# Patient Record
Sex: FEMALE | Race: WHITE | Employment: PART TIME | URBAN - METROPOLITAN AREA
[De-identification: names, ages, dates, MRNs, and addresses within clinical notes are randomized per-mention and may not be internally consistent; named-entity substitution may affect disease eponyms.]

---

## 2021-04-07 ENCOUNTER — OFFICE VISIT (OUTPATIENT)
Dept: SURGERY | Age: 56
End: 2021-04-07
Payer: COMMERCIAL

## 2021-04-07 VITALS
DIASTOLIC BLOOD PRESSURE: 76 MMHG | HEART RATE: 78 BPM | TEMPERATURE: 98.1 F | HEIGHT: 68 IN | SYSTOLIC BLOOD PRESSURE: 125 MMHG

## 2021-04-07 DIAGNOSIS — Z17.0 MALIGNANT NEOPLASM OF UPPER-INNER QUADRANT OF LEFT BREAST IN FEMALE, ESTROGEN RECEPTOR POSITIVE (HCC): Primary | ICD-10-CM

## 2021-04-07 DIAGNOSIS — C50.212 MALIGNANT NEOPLASM OF UPPER-INNER QUADRANT OF LEFT BREAST IN FEMALE, ESTROGEN RECEPTOR POSITIVE (HCC): Primary | ICD-10-CM

## 2021-04-07 PROCEDURE — 99205 OFFICE O/P NEW HI 60 MIN: CPT | Performed by: SURGERY

## 2021-04-07 PROCEDURE — 76642 ULTRASOUND BREAST LIMITED: CPT | Performed by: SURGERY

## 2021-04-07 RX ORDER — ESTRADIOL AND NORETHINDRONE ACETATE .5; .1 MG/1; MG/1
TABLET ORAL
COMMUNITY
Start: 2021-03-09 | End: 2021-04-30

## 2021-04-07 NOTE — PATIENT INSTRUCTIONS
Breast Cancer: Care Instructions Your Care Instructions Breast cancer occurs when abnormal cells grow out of control in the breast. These cancer cells can spread within the breast, to nearby lymph nodes and other tissues, and to other parts of the body. Being treated for cancer can weaken your body, and you may feel very tired. Get the rest your body needs so you can feel better. Finding out that you have cancer is scary. You may feel many emotions and may need some help coping. Seek out family, friends, and counselors for support. You also can do things at home to make yourself feel better while you go through treatment. Call the [employer] (8-334.945.9626) or visit its website at [employer] for more information. Follow-up care is a key part of your treatment and safety. Be sure to make and go to all appointments, and call your doctor if you are having problems. It's also a good idea to know your test results and keep a list of the medicines you take. How can you care for yourself at home? · Take your medicines exactly as prescribed. Call your doctor if you think you are having a problem with your medicine. You may get medicine for nausea and vomiting if you have these side effects. · Follow your doctor's instructions to relieve pain. Pain from cancer and surgery can almost always be controlled. Use pain medicine when you first notice pain, before it becomes severe. · Eat healthy food. If you do not feel like eating, try to eat food that has protein and extra calories to keep up your strength and prevent weight loss. Drink liquid meal replacements for extra calories and protein. Try to eat your main meal early. · Get some physical activity every day, but do not get too tired. Keep doing the hobbies you enjoy as your energy allows. · Do not smoke. Smoking can make your cancer worse. If you need help quitting, talk to your doctor about stop-smoking programs and medicines.  These can increase your chances of quitting for good. · Take steps to control your stress and workload. Learn relaxation techniques. ? Share your feelings. Stress and tension affect our emotions. By expressing your feelings to others, you may be able to understand and cope with them. ? Consider joining a support group. Talking about a problem with your spouse, a good friend, or other people with similar problems is a good way to reduce tension and stress. ? Express yourself through art. Try writing, crafts, dance, or art to relieve stress. Some dance, writing, or art groups may be available just for people who have cancer. ? Be kind to your body and mind. Getting enough sleep, eating a healthy diet, and taking time to do things you enjoy can contribute to an overall feeling of balance in your life and can help reduce stress. ? Get help if you need it. Discuss your concerns with your doctor or counselor. · If you are vomiting or have diarrhea: ? Drink plenty of fluids to prevent dehydration. Choose water and other caffeine-free clear liquids. If you have kidney, heart, or liver disease and have to limit fluids, talk with your doctor before you increase the amount of fluids you drink. ? When you are able to eat, try clear soups, mild foods, and liquids until all symptoms are gone for 12 to 48 hours. Other good choices include dry toast, crackers, cooked cereal, and gelatin dessert, such as Jell-O. · If you have not already done so, prepare a list of advance directives. Advance directives are instructions to your doctor and family members about what kind of care you want if you become unable to speak or express yourself. When should you call for help? Call 911 anytime you think you may need emergency care. For example, call if: 
  · You passed out (lost consciousness).   
Call your doctor now or seek immediate medical care if: 
  · You have a fever.  
  · You have abnormal bleeding.  
  · You think you have an infection.  
  · You have new or worse pain.  
  · You have new symptoms, such as a cough, belly pain, vomiting, diarrhea, or a rash. Watch closely for changes in your health, and be sure to contact your doctor if: 
  · You are much more tired than usual.  
  · You have swollen glands in your armpits, groin, or neck.  
  · You do not get better as expected. Where can you learn more? Go to http://www.[employer].com/ Enter V321 in the search box to learn more about \"Breast Cancer: Care Instructions. \" Current as of: December 17, 2020               Content Version: 12.8 © 1280-8044 [employer]. Care instructions adapted under license by [employer] (which disclaims liability or warranty for this information). If you have questions about a medical condition or this instruction, always ask your healthcare professional. [address] any warranty or liability for your use of this information.

## 2021-04-07 NOTE — PROGRESS NOTES
HISTORY OF PRESENT ILLNESS  Elton Cobb is a 63 y.o. female. HPI  NEW patient consult referred by  Dr. Clara Suarez for LEFT breast cancer. Cancer found on screening mammogram. Denies palpable lumps or breast changes. No pain. 3/10/21 - LEFT breast biopsy -  LEFT breast invasive mammary cancer, . , HER2 negative    Family History:    Maternal grandmother, breast cancer,  in her 52's  A lot of cancer on mother's side      Mammogram, 606/706 [facility] Fer[facility], 3/3/2021, BIRADS 4 left breast calcs 9:00 leading to stereotactic biopsy    No past medical history on file. No past surgical history on file.   Social History     Socioeconomic History    Marital status:      Spouse name: Not on file    Number of children: Not on file    Years of education: Not on file    Highest education level: Not on file   Occupational History    Not on file   Social Needs    Financial resource strain: Not on file    Food insecurity     Worry: Not on file     Inability: Not on file    Transportation needs     Medical: Not on file     Non-medical: Not on file   Tobacco Use    Smoking status: Never Smoker    Smokeless tobacco: Never Used   Substance and Sexual Activity    Alcohol use: Not Currently    Drug use: Not on file    Sexual activity: Not on file   Lifestyle    Physical activity     Days per week: Not on file     Minutes per session: Not on file    Stress: Not on file   Relationships    Social connections     Talks on phone: Not on file     Gets together: Not on file     Attends [employer] service: Not on file     Active member of club or organization: Not on file     Attends meetings of clubs or organizations: Not on file     Relationship status: Not on file    Intimate partner violence     Fear of current or ex partner: Not on file     Emotionally abused: Not on file     Physically abused: Not on file     Forced sexual activity: Not on file   Other Topics Concern    Not on file   Social History Narrative  Not on file     OB History        2    Para        Term                AB        Living           SAB        TAB        Ectopic        Molar        Multiple        Live Births              Obstetric Comments   Menarche 14, LMP 45, # of children 2, age of 4st delivery 21, Hysterectomy/oophorectomy no/no, Breast bx yes, history of breast feeding yes, BCP yes, Hormone therapy yes             Current Outpatient Medications:     estradiol-norethindrone (ACTIVELLA) 0.5-0.1 mg per tablet, TAKE 1 TABLET BY MOUTH EVERY DAY, Disp: , Rfl:   Allergies   Allergen Reactions    Other Plant, Animal, Environmental Other (comments)     Cats and dogs        Review of Systems   HENT: Positive for tinnitus. All other systems reviewed and are negative. Physical Exam  Vitals signs and nursing note reviewed. Constitutional:       Appearance: She is well-developed. HENT:      Head: Normocephalic. Neck:      Musculoskeletal: Neck supple. Thyroid: No thyromegaly. Cardiovascular:      Rate and Rhythm: Normal rate and regular rhythm. Heart sounds: Normal heart sounds. Pulmonary:      Effort: Pulmonary effort is normal.      Breath sounds: Normal breath sounds. Chest:      Breasts: Breasts are symmetrical.         Right: No inverted nipple, mass, nipple discharge, skin change or tenderness. Left: No inverted nipple, mass, nipple discharge, skin change or tenderness. Abdominal:      Palpations: Abdomen is soft. Musculoskeletal: Normal range of motion. Lymphadenopathy:      Cervical: No cervical adenopathy. Skin:     General: Skin is warm and dry. Neurological:      Mental Status: She is alert and oriented to person, place, and time. BREAST ULTRASOUND, Preop planning  Indication:preop planning  left Breast 9:00   Technique:   The area was scanned using a high-frequency linear-array near-field transducer  Findings: biopsy site not visible   Impression: Biopsy site not visible with ultrasound  Disposition:  Will schedule mri then lumpectomy with sentinel lymph node biopsy  ASSESSMENT and PLAN    ICD-10-CM ICD-9-CM    1. Malignant neoplasm of upper-inner quadrant of left breast in female, estrogen receptor positive ([facility] [address]  C50.212 174.2 MRI BREAST BI W WO CONT    Z17.0 V86.0      64 yo female with left breast  LEFT breast invasive mammary cancer, . , HER2 negative    I have reviewed the imaging and pathology with her and she was given copies of these reports. 90 minutes were spent face-to-face with the patient during this encounter and 90% of that time was spent on counseling and coordination of care. 1. Discussed lumpectomy and radiation vs mastectomy. Discussed reconstruction. MRI ordered to see if patient is a candidate for a lumpectomy. 2. Discussed sentinel lymph node biopsy. 3. Discussed external beam radiation. 4. Discussed hormone therapy. 5. Discussed the possibility of chemotherapy. Will call patient after the mri.    Plan likely left magseed guided lumpectomy, sln biopsy

## 2021-04-07 NOTE — LETTER
4/9/2021 Patient: Kavitha Chadwick YOB: 1965 Date of Visit: 4/7/2021 Villa Dooley MD 
[address] [address] [address] [employer] Dear Villa Dooley MD, Thank you for referring Ms. Kavitha Chadwick to [place] [id number] [facility] for evaluation. My notes for this consultation are attached. If you have questions, please do not hesitate to call me. I look forward to following your patient along with you. Sincerely, Andrade Acosta MD

## 2021-04-09 ENCOUNTER — TELEPHONE (OUTPATIENT)
Dept: SURGERY | Age: 56
End: 2021-04-09

## 2021-04-09 NOTE — TELEPHONE ENCOUNTER
Attempted to call patient to let her know she needs to bring her mammogram films to her MRI appointment. She did not bring them to the office for her appointment. I let her know at that time. I called her but there was no answer and her mailbox is full. Unable to leave her a message.

## 2021-04-13 ENCOUNTER — HOSPITAL ENCOUNTER (OUTPATIENT)
Dept: MRI IMAGING | Age: 56
Discharge: HOME | End: 2021-04-13
Attending: SURGERY
Payer: COMMERCIAL

## 2021-04-13 VITALS — WEIGHT: 164 LBS | BODY MASS INDEX: 25.31 KG/M2

## 2021-04-13 DIAGNOSIS — C50.212 MALIGNANT NEOPLASM OF UPPER-INNER QUADRANT OF LEFT BREAST IN FEMALE, ESTROGEN RECEPTOR POSITIVE (HCC): ICD-10-CM

## 2021-04-13 DIAGNOSIS — Z17.0 MALIGNANT NEOPLASM OF UPPER-INNER QUADRANT OF LEFT BREAST IN FEMALE, ESTROGEN RECEPTOR POSITIVE (HCC): ICD-10-CM

## 2021-04-13 PROCEDURE — 77049 MRI BREAST C-+ W/CAD BI: CPT

## 2021-04-13 PROCEDURE — 74011000258 HC RX REV CODE- 258: Performed by: SURGERY

## 2021-04-13 PROCEDURE — A9585 GADOBUTROL INJECTION: HCPCS | Performed by: SURGERY

## 2021-04-13 PROCEDURE — 74011250636 HC RX REV CODE- 250/636: Performed by: SURGERY

## 2021-04-13 RX ORDER — SODIUM CHLORIDE 0.9 % (FLUSH) 0.9 %
10 SYRINGE (ML) INJECTION
Status: COMPLETED | OUTPATIENT
Start: 2021-04-13 | End: 2021-04-13

## 2021-04-13 RX ADMIN — Medication 10 ML: at 13:09

## 2021-04-13 RX ADMIN — SODIUM CHLORIDE 100 ML: 900 INJECTION, SOLUTION INTRAVENOUS at 13:07

## 2021-04-13 RX ADMIN — GADOBUTROL 7.5 ML: 604.72 INJECTION INTRAVENOUS at 13:07

## 2021-04-14 ENCOUNTER — TELEPHONE (OUTPATIENT)
Dept: SURGERY | Age: 56
End: 2021-04-14

## 2021-04-14 DIAGNOSIS — C50.912 MALIGNANT NEOPLASM OF LEFT FEMALE BREAST, UNSPECIFIED ESTROGEN RECEPTOR STATUS, UNSPECIFIED SITE OF BREAST (HCC): Primary | ICD-10-CM

## 2021-04-14 DIAGNOSIS — Q89.09 SPLEEN ANOMALY: ICD-10-CM

## 2021-04-14 DIAGNOSIS — I31.8 PERICARDIAL MASS: ICD-10-CM

## 2021-04-14 NOTE — PROGRESS NOTES
Called patient with mri  Amenable to lumpectomy  Multiple incidental chest and abdominal findings seen on mri  Recommend ct scans  Will order  Cannot schedule surgery until this done.   She was appreciative

## 2021-04-19 ENCOUNTER — HOSPITAL ENCOUNTER (OUTPATIENT)
Dept: CT IMAGING | Age: 56
Discharge: HOME | End: 2021-04-19
Attending: SURGERY
Payer: COMMERCIAL

## 2021-04-19 DIAGNOSIS — C50.912 MALIGNANT NEOPLASM OF LEFT FEMALE BREAST, UNSPECIFIED ESTROGEN RECEPTOR STATUS, UNSPECIFIED SITE OF BREAST (HCC): ICD-10-CM

## 2021-04-19 DIAGNOSIS — I31.8 PERICARDIAL MASS: ICD-10-CM

## 2021-04-19 DIAGNOSIS — Q89.09 SPLEEN ANOMALY: ICD-10-CM

## 2021-04-19 PROCEDURE — 71260 CT THORAX DX C+: CPT

## 2021-04-19 PROCEDURE — 74011000636 HC RX REV CODE- 636: Performed by: SURGERY

## 2021-04-19 PROCEDURE — 74177 CT ABD & PELVIS W/CONTRAST: CPT

## 2021-04-19 PROCEDURE — 74011000250 HC RX REV CODE- 250: Performed by: SURGERY

## 2021-04-19 RX ORDER — BARIUM SULFATE 20 MG/ML
900 SUSPENSION ORAL
Status: COMPLETED | OUTPATIENT
Start: 2021-04-19 | End: 2021-04-19

## 2021-04-19 RX ADMIN — BARIUM SULFATE 900 ML: 21 SUSPENSION ORAL at 19:12

## 2021-04-19 RX ADMIN — IOPAMIDOL 100 ML: 755 INJECTION, SOLUTION INTRAVENOUS at 19:11

## 2021-04-20 ENCOUNTER — TELEPHONE (OUTPATIENT)
Dept: SURGERY | Age: 56
End: 2021-04-20

## 2021-04-20 NOTE — PROGRESS NOTES
Called patient   Ct scan with incidental findings  Left vm per hipaa  Will repeat ct in future.    [id number] [address] Patterson for nurses to give info

## 2021-04-20 NOTE — TELEPHONE ENCOUNTER
Returned patient's call. She had asked about imaging results and is ready to schedule surgery. She was not available. I LM that her imaging was fine with only incidental findings. Short term follow-up is recommended, probably 3-6 months, to confirm stability. I let her know that I would have Dr. Sindy Reyes place an order for her surgery when she returns to the office on 4/22/2021.

## 2021-04-21 ENCOUNTER — TELEPHONE (OUTPATIENT)
Dept: SURGERY | Age: 56
End: 2021-04-21

## 2021-04-21 NOTE — TELEPHONE ENCOUNTER
Returned patient's call. Asking about when surgery will be scheduled, when to see XRT, etc.    I called back, but she was unavailable. I LM for her letting her know that Dr. Rosalee Moreno will put in her surgery order when she is back in the office tomorrow. I also let her know that we will handle the radiation oncology appointment after the surgery. I left my contact information in case she had further questions.

## 2021-04-26 ENCOUNTER — TELEPHONE (OUTPATIENT)
Dept: ONCOLOGY | Age: 56
End: 2021-04-26

## 2021-04-26 NOTE — TELEPHONE ENCOUNTER
Oncology Nurse [address] [facility]   Phone: [phone]    Received a VM today as well an as incoming call from Ms. Tomas Overton -- seeks info about date of her proposed surgery with Dr. Rosalee Moreno. Advised that Ms. Tomas Overton spoke several times last week with breast navigator, Mitch Hood, who is continuing to follow and help coordinate scheduling and any support she may need. Ms. Tomas Overton was inadvertently given my contact number -- she was appreciative of my help and we agreed that I will contact Mitch Hood on her behalf. She's also aware that she can contact Malia Hoover at DR YAO MANNING [facility] office number.     Triny Wong MS RN AOCNS

## 2021-04-27 ENCOUNTER — TELEPHONE (OUTPATIENT)
Dept: SURGERY | Age: 56
End: 2021-04-27

## 2021-04-27 NOTE — TELEPHONE ENCOUNTER
Returned patient's call re attempting to schedule surgery. Patient unavailable times three. Finally was able to text her to let her know that the surgery scheduler is trying to reach her. Finally spoke to patient. She is off tomorrow. I asked her to clear her voicemail so she could get a message from DR YAO MANNING [facility] [[email or web address]] surgery scheduler, and she will do this. She says that any day is fine for surgery and Maude Sharif can leave a message with date/time, and she can call her back if she misses the call. Somehow she thought she was supposed to call RENAY to get this set up. She has the office number for DR YAO MANNING [facility] [[email or web address]] and understands she sets up the surgery through this office.

## 2021-04-30 ENCOUNTER — HOSPITAL ENCOUNTER (OUTPATIENT)
Dept: PREADMISSION TESTING | Age: 56
Discharge: HOME | End: 2021-04-30
Payer: COMMERCIAL

## 2021-04-30 VITALS
TEMPERATURE: 97.8 F | RESPIRATION RATE: 16 BRPM | WEIGHT: 170.86 LBS | SYSTOLIC BLOOD PRESSURE: 124 MMHG | DIASTOLIC BLOOD PRESSURE: 74 MMHG | BODY MASS INDEX: 26.82 KG/M2 | HEART RATE: 71 BPM | HEIGHT: 67 IN

## 2021-04-30 DIAGNOSIS — Z17.0 MALIGNANT NEOPLASM OF UPPER-INNER QUADRANT OF LEFT BREAST IN FEMALE, ESTROGEN RECEPTOR POSITIVE (HCC): Primary | ICD-10-CM

## 2021-04-30 DIAGNOSIS — C50.212 MALIGNANT NEOPLASM OF UPPER-INNER QUADRANT OF LEFT BREAST IN FEMALE, ESTROGEN RECEPTOR POSITIVE (HCC): Primary | ICD-10-CM

## 2021-04-30 LAB
BASOPHILS # BLD: 0 K/UL (ref 0–0.1)
BASOPHILS NFR BLD: 1 % (ref 0–1)
DIFFERENTIAL METHOD BLD: NORMAL
EOSINOPHIL # BLD: 0.2 K/UL (ref 0–0.4)
EOSINOPHIL NFR BLD: 4 % (ref 0–7)
ERYTHROCYTE [DISTWIDTH] IN BLOOD BY AUTOMATED COUNT: 12.5 % (ref 11.5–14.5)
HCT VFR BLD AUTO: 38.3 % (ref 35–47)
HGB BLD-MCNC: 12.3 G/DL (ref 11.5–16)
IMM GRANULOCYTES # BLD AUTO: 0 K/UL (ref 0–0.04)
IMM GRANULOCYTES NFR BLD AUTO: 0 % (ref 0–0.5)
LYMPHOCYTES # BLD: 1.8 K/UL (ref 0.8–3.5)
LYMPHOCYTES NFR BLD: 30 % (ref 12–49)
MCH RBC QN AUTO: 29.6 PG (ref 26–34)
MCHC RBC AUTO-ENTMCNC: 32.1 G/DL (ref 30–36.5)
MCV RBC AUTO: 92.3 FL (ref 80–99)
MONOCYTES # BLD: 0.4 K/UL (ref 0–1)
MONOCYTES NFR BLD: 6 % (ref 5–13)
NEUTS SEG # BLD: 3.6 K/UL (ref 1.8–8)
NEUTS SEG NFR BLD: 59 % (ref 32–75)
NRBC # BLD: 0 K/UL (ref 0–0.01)
NRBC BLD-RTO: 0 PER 100 WBC
PLATELET # BLD AUTO: 242 K/UL (ref 150–400)
PMV BLD AUTO: 10.9 FL (ref 8.9–12.9)
RBC # BLD AUTO: 4.15 M/UL (ref 3.8–5.2)
WBC # BLD AUTO: 6.1 K/UL (ref 3.6–11)

## 2021-04-30 PROCEDURE — 85025 COMPLETE CBC W/AUTO DIFF WBC: CPT

## 2021-04-30 PROCEDURE — 36415 COLL VENOUS BLD VENIPUNCTURE: CPT

## 2021-04-30 NOTE — PROGRESS NOTES
PT REQUESTS NP TO REVIEW DEVEN SCORE AND CONSULT. DO NOT EAT OR DRINK ANYTHING AFTER MIDNIGHT, except as instructed THE NIGHT BEFORE SURGERY. PT GIVEN OPPORTUNITY TO ASK ADDITIONAL QUESTIONS. Patient given two bottles CHG soap and instruction sheet, instructions for use reviewed with patient. Patient given surgical site infection information FAQs handout and hand hygiene tips sheet. Pre-operative instructions reviewed and patient verbalizes understanding of instructions. Patient has been given the opportunity to ask additional questions. [facility] APPOINTMENTS REVIEWED AND GIVEN TO PATIENT.    COVID-19 INSTRUCTION SHEET ALSO REVIEWED AND GIVEN TO PATIENT.    **ANESTHESIA PROTOCOL FOLLOWED FOR ORDERS

## 2021-05-03 ENCOUNTER — TELEPHONE (OUTPATIENT)
Dept: SURGERY | Age: 56
End: 2021-05-03

## 2021-05-03 NOTE — TELEPHONE ENCOUNTER
Patient called and left a message on the nurse voicemail asking about surgery and some dates that she sees on my chart. I attempted to call her back and there was no answer. I let her know on the answering machine that she needs a needle loc on 5/5/21. (on message she left me she asked about 5/6/21- I don't see anything scheduled on that day). I also let her know that Loren Ramos, our surgery scheduler would call her in the morning to review dates for surgery and what she needs to do and where she needs to go.

## 2021-05-04 NOTE — TELEPHONE ENCOUNTER
I called and left another message . I let her know I would be here all day and tomorrow. She questions the magseed.

## 2021-05-05 ENCOUNTER — HOSPITAL ENCOUNTER (OUTPATIENT)
Dept: MAMMOGRAPHY | Age: 56
Discharge: HOME | End: 2021-05-05
Attending: SURGERY
Payer: COMMERCIAL

## 2021-05-05 DIAGNOSIS — R92.8 ABNORMAL MAMMOGRAM: ICD-10-CM

## 2021-05-05 PROCEDURE — 74011000250 HC RX REV CODE- 250: Performed by: RADIOLOGY

## 2021-05-05 PROCEDURE — 19283 PERQ DEV BREAST 1ST STRTCTC: CPT

## 2021-05-05 RX ORDER — LIDOCAINE HYDROCHLORIDE 10 MG/ML
5 INJECTION INFILTRATION; PERINEURAL
Status: COMPLETED | OUTPATIENT
Start: 2021-05-05 | End: 2021-05-05

## 2021-05-05 RX ADMIN — LIDOCAINE HYDROCHLORIDE 5 ML: 10 INJECTION, SOLUTION INFILTRATION; PERINEURAL at 10:45

## 2021-05-05 NOTE — PROGRESS NOTES
Patient tolerated left breast mag seed placement well with scant bleeding. Site was covered with a bandage and patient was instructed to keep it clean and dry for 24 hours. Encouraged her to call with any questions.

## 2021-05-07 ENCOUNTER — HOSPITAL ENCOUNTER (OUTPATIENT)
Dept: PREADMISSION TESTING | Age: 56
Discharge: HOME | End: 2021-05-07
Payer: COMMERCIAL

## 2021-05-07 ENCOUNTER — TRANSCRIBE ORDER (OUTPATIENT)
Dept: REGISTRATION | Age: 56
End: 2021-05-07

## 2021-05-07 DIAGNOSIS — Z01.812 PRE-PROCEDURE LAB EXAM: Primary | ICD-10-CM

## 2021-05-07 DIAGNOSIS — Z01.812 PRE-PROCEDURE LAB EXAM: ICD-10-CM

## 2021-05-07 PROCEDURE — U0003 INFECTIOUS AGENT DETECTION BY NUCLEIC ACID (DNA OR RNA); SEVERE ACUTE RESPIRATORY SYNDROME CORONAVIRUS 2 (SARS-COV-2) (CORONAVIRUS DISEASE [COVID-19]), AMPLIFIED PROBE TECHNIQUE, MAKING USE OF HIGH THROUGHPUT TECHNOLOGIES AS DESCRIBED BY CMS-2020-01-R: HCPCS

## 2021-05-08 LAB — SARS-COV-2, COV2NT: NOT DETECTED

## 2021-05-11 ENCOUNTER — HOSPITAL ENCOUNTER (OUTPATIENT)
Age: 56
Setting detail: OUTPATIENT SURGERY
Discharge: HOME | End: 2021-05-11
Attending: SURGERY | Admitting: SURGERY
Payer: COMMERCIAL

## 2021-05-11 ENCOUNTER — APPOINTMENT (OUTPATIENT)
Dept: NUCLEAR MEDICINE | Age: 56
End: 2021-05-11
Attending: SURGERY
Payer: COMMERCIAL

## 2021-05-11 ENCOUNTER — APPOINTMENT (OUTPATIENT)
Dept: MAMMOGRAPHY | Age: 56
End: 2021-05-11
Attending: SURGERY
Payer: COMMERCIAL

## 2021-05-11 ENCOUNTER — ANESTHESIA EVENT (OUTPATIENT)
Dept: MEDSURG UNIT | Age: 56
End: 2021-05-11
Payer: COMMERCIAL

## 2021-05-11 ENCOUNTER — ANESTHESIA (OUTPATIENT)
Dept: MEDSURG UNIT | Age: 56
End: 2021-05-11
Payer: COMMERCIAL

## 2021-05-11 VITALS
RESPIRATION RATE: 17 BRPM | WEIGHT: 170 LBS | SYSTOLIC BLOOD PRESSURE: 129 MMHG | TEMPERATURE: 97.7 F | DIASTOLIC BLOOD PRESSURE: 72 MMHG | BODY MASS INDEX: 26.68 KG/M2 | HEART RATE: 66 BPM | OXYGEN SATURATION: 95 % | HEIGHT: 67 IN

## 2021-05-11 DIAGNOSIS — C50.212 MALIGNANT NEOPLASM OF UPPER-INNER QUADRANT OF LEFT BREAST IN FEMALE, ESTROGEN RECEPTOR POSITIVE (HCC): ICD-10-CM

## 2021-05-11 DIAGNOSIS — Z17.0 MALIGNANT NEOPLASM OF UPPER-INNER QUADRANT OF LEFT BREAST IN FEMALE, ESTROGEN RECEPTOR POSITIVE (HCC): ICD-10-CM

## 2021-05-11 PROCEDURE — 74011250636 HC RX REV CODE- 250/636: Performed by: NURSE ANESTHETIST, CERTIFIED REGISTERED

## 2021-05-11 PROCEDURE — 74011000250 HC RX REV CODE- 250: Performed by: NURSE ANESTHETIST, CERTIFIED REGISTERED

## 2021-05-11 PROCEDURE — 77030041680 HC PNCL ELECSURG SMK EVAC CNMD -B: Performed by: SURGERY

## 2021-05-11 PROCEDURE — C1894 INTRO/SHEATH, NON-LASER: HCPCS | Performed by: SURGERY

## 2021-05-11 PROCEDURE — 74011250637 HC RX REV CODE- 250/637: Performed by: ANESTHESIOLOGY

## 2021-05-11 PROCEDURE — 76030000001 HC AMB SURG OR TIME 1 TO 1.5: Performed by: SURGERY

## 2021-05-11 PROCEDURE — 74011250637 HC RX REV CODE- 250/637: Performed by: SURGERY

## 2021-05-11 PROCEDURE — A9520 TC99 TILMANOCEPT DIAG 0.5MCI: HCPCS

## 2021-05-11 PROCEDURE — 77030040361 HC SLV COMPR DVT MDII -B: Performed by: SURGERY

## 2021-05-11 PROCEDURE — 74011250636 HC RX REV CODE- 250/636: Performed by: SURGERY

## 2021-05-11 PROCEDURE — 77030011267 HC ELECTRD BLD COVD -A: Performed by: SURGERY

## 2021-05-11 PROCEDURE — 76060000062 HC AMB SURG ANES 1 TO 1.5 HR: Performed by: SURGERY

## 2021-05-11 PROCEDURE — 77030031139 HC SUT VCRL2 J&J -A: Performed by: SURGERY

## 2021-05-11 PROCEDURE — 77030002996 HC SUT SLK J&J -A: Performed by: SURGERY

## 2021-05-11 PROCEDURE — 88307 TISSUE EXAM BY PATHOLOGIST: CPT

## 2021-05-11 PROCEDURE — 74011250636 HC RX REV CODE- 250/636: Performed by: ANESTHESIOLOGY

## 2021-05-11 PROCEDURE — 77030002933 HC SUT MCRYL J&J -A: Performed by: SURGERY

## 2021-05-11 PROCEDURE — 77030010507 HC ADH SKN DERMBND J&J -B: Performed by: SURGERY

## 2021-05-11 PROCEDURE — 74011000250 HC RX REV CODE- 250: Performed by: SURGERY

## 2021-05-11 PROCEDURE — 77030010509 HC AIRWY LMA MSK TELE -A: Performed by: ANESTHESIOLOGY

## 2021-05-11 PROCEDURE — 76210000036 HC AMBSU PH I REC 1.5 TO 2 HR: Performed by: SURGERY

## 2021-05-11 PROCEDURE — 2709999900 HC NON-CHARGEABLE SUPPLY: Performed by: SURGERY

## 2021-05-11 PROCEDURE — 77030034626 HC LIGASURE SM JAW SEAL OPN SURG COVD -E: Performed by: SURGERY

## 2021-05-11 PROCEDURE — C9290 INJ, BUPIVACAINE LIPOSOME: HCPCS | Performed by: SURGERY

## 2021-05-11 RX ORDER — FENTANYL CITRATE 50 UG/ML
INJECTION, SOLUTION INTRAMUSCULAR; INTRAVENOUS AS NEEDED
Status: DISCONTINUED | OUTPATIENT
Start: 2021-05-11 | End: 2021-05-11 | Stop reason: HOSPADM

## 2021-05-11 RX ORDER — SODIUM CHLORIDE 0.9 % (FLUSH) 0.9 %
5-40 SYRINGE (ML) INJECTION AS NEEDED
Status: DISCONTINUED | OUTPATIENT
Start: 2021-05-11 | End: 2021-05-11 | Stop reason: HOSPADM

## 2021-05-11 RX ORDER — LIDOCAINE HYDROCHLORIDE 10 MG/ML
0.1 INJECTION, SOLUTION EPIDURAL; INFILTRATION; INTRACAUDAL; PERINEURAL AS NEEDED
Status: DISCONTINUED | OUTPATIENT
Start: 2021-05-11 | End: 2021-05-11 | Stop reason: HOSPADM

## 2021-05-11 RX ORDER — FENTANYL CITRATE 50 UG/ML
50 INJECTION, SOLUTION INTRAMUSCULAR; INTRAVENOUS AS NEEDED
Status: DISCONTINUED | OUTPATIENT
Start: 2021-05-11 | End: 2021-05-11 | Stop reason: HOSPADM

## 2021-05-11 RX ORDER — PHENYLEPHRINE HCL IN 0.9% NACL 0.4MG/10ML
SYRINGE (ML) INTRAVENOUS AS NEEDED
Status: DISCONTINUED | OUTPATIENT
Start: 2021-05-11 | End: 2021-05-11 | Stop reason: HOSPADM

## 2021-05-11 RX ORDER — ACETAMINOPHEN 325 MG/1
650 TABLET ORAL ONCE
Status: COMPLETED | OUTPATIENT
Start: 2021-05-11 | End: 2021-05-11

## 2021-05-11 RX ORDER — ONDANSETRON 4 MG/1
4 TABLET, ORALLY DISINTEGRATING ORAL
Qty: 5 TAB | Refills: 1 | Status: SHIPPED | OUTPATIENT
Start: 2021-05-11 | End: 2021-06-06

## 2021-05-11 RX ORDER — OXYCODONE AND ACETAMINOPHEN 5; 325 MG/1; MG/1
1 TABLET ORAL
Qty: 30 TAB | Refills: 0 | Status: SHIPPED | OUTPATIENT
Start: 2021-05-11 | End: 2021-05-16

## 2021-05-11 RX ORDER — ONDANSETRON 2 MG/ML
4 INJECTION INTRAMUSCULAR; INTRAVENOUS AS NEEDED
Status: DISCONTINUED | OUTPATIENT
Start: 2021-05-11 | End: 2021-05-11 | Stop reason: HOSPADM

## 2021-05-11 RX ORDER — SODIUM CHLORIDE 9 MG/ML
25 INJECTION, SOLUTION INTRAVENOUS CONTINUOUS
Status: DISCONTINUED | OUTPATIENT
Start: 2021-05-11 | End: 2021-05-11 | Stop reason: HOSPADM

## 2021-05-11 RX ORDER — HYDROMORPHONE HYDROCHLORIDE 1 MG/ML
0.2 INJECTION, SOLUTION INTRAMUSCULAR; INTRAVENOUS; SUBCUTANEOUS
Status: DISCONTINUED | OUTPATIENT
Start: 2021-05-11 | End: 2021-05-11 | Stop reason: HOSPADM

## 2021-05-11 RX ORDER — SODIUM CHLORIDE 0.9 % (FLUSH) 0.9 %
5-40 SYRINGE (ML) INJECTION EVERY 8 HOURS
Status: DISCONTINUED | OUTPATIENT
Start: 2021-05-11 | End: 2021-05-11 | Stop reason: HOSPADM

## 2021-05-11 RX ORDER — MORPHINE SULFATE 2 MG/ML
2 INJECTION, SOLUTION INTRAMUSCULAR; INTRAVENOUS
Status: DISCONTINUED | OUTPATIENT
Start: 2021-05-11 | End: 2021-05-11 | Stop reason: HOSPADM

## 2021-05-11 RX ORDER — MIDAZOLAM HYDROCHLORIDE 1 MG/ML
0.5 INJECTION, SOLUTION INTRAMUSCULAR; INTRAVENOUS
Status: DISCONTINUED | OUTPATIENT
Start: 2021-05-11 | End: 2021-05-11 | Stop reason: HOSPADM

## 2021-05-11 RX ORDER — SCOLOPAMINE TRANSDERMAL SYSTEM 1 MG/1
1 PATCH, EXTENDED RELEASE TRANSDERMAL ONCE
Status: DISCONTINUED | OUTPATIENT
Start: 2021-05-11 | End: 2021-05-11 | Stop reason: HOSPADM

## 2021-05-11 RX ORDER — DEXAMETHASONE SODIUM PHOSPHATE 4 MG/ML
INJECTION, SOLUTION INTRA-ARTICULAR; INTRALESIONAL; INTRAMUSCULAR; INTRAVENOUS; SOFT TISSUE AS NEEDED
Status: DISCONTINUED | OUTPATIENT
Start: 2021-05-11 | End: 2021-05-11 | Stop reason: HOSPADM

## 2021-05-11 RX ORDER — OXYCODONE AND ACETAMINOPHEN 5; 325 MG/1; MG/1
1 TABLET ORAL AS NEEDED
Status: DISCONTINUED | OUTPATIENT
Start: 2021-05-11 | End: 2021-05-11 | Stop reason: HOSPADM

## 2021-05-11 RX ORDER — DIPHENHYDRAMINE HYDROCHLORIDE 50 MG/ML
12.5 INJECTION, SOLUTION INTRAMUSCULAR; INTRAVENOUS AS NEEDED
Status: DISCONTINUED | OUTPATIENT
Start: 2021-05-11 | End: 2021-05-11 | Stop reason: HOSPADM

## 2021-05-11 RX ORDER — ONDANSETRON 2 MG/ML
INJECTION INTRAMUSCULAR; INTRAVENOUS AS NEEDED
Status: DISCONTINUED | OUTPATIENT
Start: 2021-05-11 | End: 2021-05-11 | Stop reason: HOSPADM

## 2021-05-11 RX ORDER — FENTANYL CITRATE 50 UG/ML
25 INJECTION, SOLUTION INTRAMUSCULAR; INTRAVENOUS
Status: DISCONTINUED | OUTPATIENT
Start: 2021-05-11 | End: 2021-05-11 | Stop reason: HOSPADM

## 2021-05-11 RX ORDER — SODIUM CHLORIDE, SODIUM LACTATE, POTASSIUM CHLORIDE, CALCIUM CHLORIDE 600; 310; 30; 20 MG/100ML; MG/100ML; MG/100ML; MG/100ML
125 INJECTION, SOLUTION INTRAVENOUS CONTINUOUS
Status: DISCONTINUED | OUTPATIENT
Start: 2021-05-11 | End: 2021-05-11 | Stop reason: HOSPADM

## 2021-05-11 RX ORDER — PROPOFOL 10 MG/ML
INJECTION, EMULSION INTRAVENOUS AS NEEDED
Status: DISCONTINUED | OUTPATIENT
Start: 2021-05-11 | End: 2021-05-11 | Stop reason: HOSPADM

## 2021-05-11 RX ORDER — MIDAZOLAM HYDROCHLORIDE 1 MG/ML
INJECTION, SOLUTION INTRAMUSCULAR; INTRAVENOUS AS NEEDED
Status: DISCONTINUED | OUTPATIENT
Start: 2021-05-11 | End: 2021-05-11 | Stop reason: HOSPADM

## 2021-05-11 RX ORDER — MIDAZOLAM HYDROCHLORIDE 1 MG/ML
1 INJECTION, SOLUTION INTRAMUSCULAR; INTRAVENOUS AS NEEDED
Status: DISCONTINUED | OUTPATIENT
Start: 2021-05-11 | End: 2021-05-11 | Stop reason: HOSPADM

## 2021-05-11 RX ORDER — LIDOCAINE HYDROCHLORIDE 20 MG/ML
INJECTION, SOLUTION EPIDURAL; INFILTRATION; INTRACAUDAL; PERINEURAL AS NEEDED
Status: DISCONTINUED | OUTPATIENT
Start: 2021-05-11 | End: 2021-05-11 | Stop reason: HOSPADM

## 2021-05-11 RX ADMIN — WATER 2 G: 1 INJECTION INTRAMUSCULAR; INTRAVENOUS; SUBCUTANEOUS at 14:25

## 2021-05-11 RX ADMIN — FENTANYL CITRATE 50 MCG: 50 INJECTION, SOLUTION INTRAMUSCULAR; INTRAVENOUS at 14:52

## 2021-05-11 RX ADMIN — MIDAZOLAM 2 MG: 1 INJECTION INTRAMUSCULAR; INTRAVENOUS at 14:11

## 2021-05-11 RX ADMIN — SODIUM CHLORIDE, POTASSIUM CHLORIDE, SODIUM LACTATE AND CALCIUM CHLORIDE 125 ML/HR: 600; 310; 30; 20 INJECTION, SOLUTION INTRAVENOUS at 14:00

## 2021-05-11 RX ADMIN — PROPOFOL 25 MG: 10 INJECTION, EMULSION INTRAVENOUS at 14:51

## 2021-05-11 RX ADMIN — Medication 120 MCG: at 14:35

## 2021-05-11 RX ADMIN — FENTANYL CITRATE 25 MCG: 50 INJECTION, SOLUTION INTRAMUSCULAR; INTRAVENOUS at 14:18

## 2021-05-11 RX ADMIN — ONDANSETRON HYDROCHLORIDE 4 MG: 2 INJECTION, SOLUTION INTRAMUSCULAR; INTRAVENOUS at 14:25

## 2021-05-11 RX ADMIN — PROPOFOL 25 MG: 10 INJECTION, EMULSION INTRAVENOUS at 14:52

## 2021-05-11 RX ADMIN — LIDOCAINE HYDROCHLORIDE 60 MG: 20 INJECTION, SOLUTION EPIDURAL; INFILTRATION; INTRACAUDAL; PERINEURAL at 14:18

## 2021-05-11 RX ADMIN — DEXAMETHASONE SODIUM PHOSPHATE 4 MG: 4 INJECTION, SOLUTION INTRAMUSCULAR; INTRAVENOUS at 14:25

## 2021-05-11 RX ADMIN — PROPOFOL 100 MG: 10 INJECTION, EMULSION INTRAVENOUS at 14:18

## 2021-05-11 RX ADMIN — PROPOFOL 50 MG: 10 INJECTION, EMULSION INTRAVENOUS at 14:25

## 2021-05-11 RX ADMIN — ACETAMINOPHEN 650 MG: 325 TABLET ORAL at 14:00

## 2021-05-11 NOTE — H&P
History and Physical    HISTORY OF PRESENT ILLNESS  Chris Pritchard is a 63 y.o. female. HPI  NEW patient consult referred by  Dr. Hannah Meneses for LEFT breast cancer. Cancer found on screening mammogram. Denies palpable lumps or breast changes. No pain.     3/10/21 - LEFT breast biopsy -  LEFT breast invasive mammary cancer, . , HER2 negative     Family History:     Maternal grandmother, breast cancer,  in her 52's  A lot of cancer on mother's side        Mammogram, 606/706 [facility], 3/3/2021, BIRADS 4 left breast calcs 9:00 leading to stereotactic biopsy     No past medical history on file. No past surgical history on file.   Social History            Socioeconomic History    Marital status:        Spouse name: Not on file    Number of children: Not on file    Years of education: Not on file    Highest education level: Not on file   Occupational History    Not on file   Social Needs    Financial resource strain: Not on file    Food insecurity       Worry: Not on file       Inability: Not on file    Transportation needs       Medical: Not on file       Non-medical: Not on file   Tobacco Use    Smoking status: Never Smoker    Smokeless tobacco: Never Used   Substance and Sexual Activity    Alcohol use: Not Currently    Drug use: Not on file    Sexual activity: Not on file   Lifestyle    Physical activity       Days per week: Not on file       Minutes per session: Not on file    Stress: Not on file   Relationships    Social connections       Talks on phone: Not on file       Gets together: Not on file       Attends [employer] service: Not on file       Active member of club or organization: Not on file       Attends meetings of clubs or organizations: Not on file       Relationship status: Not on file    Intimate partner violence       Fear of current or ex partner: Not on file       Emotionally abused: Not on file       Physically abused: Not on file       Forced sexual activity: Not on file   Other Topics Concern    Not on file   Social History Narrative    Not on file               OB History         2    Para        Term                AB        Living            SAB        TAB        Ectopic        Molar        Multiple        Live Births               Obstetric Comments   Menarche 14, LMP 45, # of children 2, age of 3st delivery 21, Hysterectomy/oophorectomy no/no, Breast bx yes, history of breast feeding yes, BCP yes, Hormone therapy yes                Current Outpatient Medications:     estradiol-norethindrone (ACTIVELLA) 0.5-0.1 mg per tablet, TAKE 1 TABLET BY MOUTH EVERY DAY, Disp: , Rfl:         Allergies   Allergen Reactions    Other Plant, Animal, Environmental Other (comments)       Cats and dogs          Review of Systems   HENT: Positive for tinnitus. All other systems reviewed and are negative.        Physical Exam  Vitals signs and nursing note reviewed. Constitutional:       Appearance: She is well-developed. HENT:      Head: Normocephalic. Neck:      Musculoskeletal: Neck supple. Thyroid: No thyromegaly. Cardiovascular:      Rate and Rhythm: Normal rate and regular rhythm. Heart sounds: Normal heart sounds. Pulmonary:      Effort: Pulmonary effort is normal.      Breath sounds: Normal breath sounds. Chest:      Breasts: Breasts are symmetrical.         Right: No inverted nipple, mass, nipple discharge, skin change or tenderness. Left: No inverted nipple, mass, nipple discharge, skin change or tenderness. Abdominal:      Palpations: Abdomen is soft. Musculoskeletal: Normal range of motion. Lymphadenopathy:      Cervical: No cervical adenopathy. Skin:     General: Skin is warm and dry. Neurological:      Mental Status: She is alert and oriented to person, place, and time.       BREAST ULTRASOUND, Preop planning  Indication:preop planning  left Breast 9:00   Technique:   The area was scanned using a high-frequency linear-array near-field transducer  Findings: biopsy site not visible   Impression: Biopsy site not visible with ultrasound  Disposition:  Will schedule mri then lumpectomy with sentinel lymph node biopsy  ASSESSMENT and PLAN      ICD-10-CM ICD-9-CM     1. Malignant neoplasm of upper-inner quadrant of left breast in female, estrogen receptor positive ([facility] [address]  C50.212 174.2 MRI BREAST BI W WO CONT     Z17.0 V86.0        64 yo female with left breast  LEFT breast invasive mammary cancer, . , HER2 negative     I have reviewed the imaging and pathology with her and she was given copies of these reports.     90 minutes were spent face-to-face with the patient during this encounter and 90% of that time was spent on counseling and coordination of care. 1. Discussed lumpectomy and radiation vs mastectomy. Discussed reconstruction. MRI ordered to see if patient is a candidate for a lumpectomy. 2. Discussed sentinel lymph node biopsy. 3. Discussed external beam radiation. 4. Discussed hormone therapy. 5. Discussed the possibility of chemotherapy.      Will call patient after the mri.    Plan likely left magseed guided lumpectomy, sln biopsy

## 2021-05-11 NOTE — ROUTINE PROCESS
Patient: Manjinder Zarco MRN: [id number]  SSN: [id number] YOB: 1965  Age: 61 y.o. Sex: female Patient is status post Procedure(s): LEFT BREAST LUMPECTOMY WITH MAGSEED AND LEFT BREAST SENTINEL NODE BIOPSY. Surgeon(s) and Role: Frandy Lara MD - Primary Local/Dose/Irrigation:  SEE MAR Peripheral IV 05/11/21 Right Forearm (Active) Site Assessment Clean, dry, & intact 05/11/21 1346 Dressing Status Clean, dry, & intact 05/11/21 1346 Dressing Type Transparent 05/11/21 1346 Airway - Endotracheal Tube 05/11/21 (Active) Dressing/Packing:  Incision 05/11/21 Breast Left-Dressing/Treatment: Skin glue;Gauze dressing/dressing sponge(bra) (05/11/21 1518) Splint/Cast:  ] Other:

## 2021-05-11 NOTE — DISCHARGE INSTRUCTIONS
Discharge Instructions from Dr. Fran Garg    · I will call you with the pathology results, typically within 1 week from today. · You may shower, but no hot tubs, swimming pools, or baths until your incision is healed. · No heavy lifting with the affected extremity (nothing greater than 5 pounds), and limit its use for the next 4-5 days. · You may use an ice pack for comfort for the next couple of days, but do not place ice directly on the skin. Rather, use a towel or clothing to serve as a barrier between skin and ice to prevent injury. · If I placed a drain, follow the drain instructions provided, especially as you keep a record of the drain output. · Follow medication instructions carefully. No aspirin, ibuprofen or aleve x 5 days. May take tylenol instead of narcotic. · Wear surgical bra for 24 hours, then remove. Wear supportive bra at all times. · You will have bruising and swelling  · Watch for signs of infection as listed below. · Redness  · Swelling  · Drainage from the incision or from your nipple that appears infected  · Fever over 101.5 degrees for consecutive readings, or over 99.5 if you are currently undergoing chemotherapy. · Call our office (number is below) for a follow-up appointment. · If you have any problems, our phone number is [phone]        DISCHARGE SUMMARY from Nurse    PATIENT INSTRUCTIONS:    After general anesthesia or intravenous sedation, for 24 hours or while taking prescription Narcotics:  · Limit your activities  · Do not drive and operate hazardous machinery  · Do not make important personal or business decisions  · Do  not drink alcoholic beverages  · If you have not urinated within 8 hours after discharge, please contact your surgeon on call.     Report the following to your surgeon:  · Excessive pain, swelling, redness or odor of or around the surgical area  · Temperature over 100.5  · Nausea and vomiting lasting longer than 4 hours or if unable to take medications  · Any signs of decreased circulation or nerve impairment to extremity: change in color, persistent  numbness, tingling, coldness or increase pain  · Any questions    What to do at Home:  Recommended activity: See surgical instructions. If you experience any of the following symptoms as noted above, please follow up with Dr. Kurt Chatman. *  Please give a list of your current medications to your Primary Care Provider. *  Please update this list whenever your medications are discontinued, doses are      changed, or new medications (including over-the-counter products) are added. *  Please carry medication information at all times in case of emergency situations. These are general instructions for a healthy lifestyle:    No smoking/ No tobacco products/ Avoid exposure to second hand smoke  Surgeon General's Warning:  Quitting smoking now greatly reduces serious risk to your health. Obesity, smoking, and sedentary lifestyle greatly increases your risk for illness    A healthy diet, regular physical exercise & weight monitoring are important for maintaining a healthy lifestyle    You may be retaining fluid if you have a history of heart failure or if you experience any of the following symptoms:  Weight gain of 3 pounds or more overnight or 5 pounds in a week, increased swelling in our hands or feet or shortness of breath while lying flat in bed. Please call your doctor as soon as you notice any of these symptoms; do not wait until your next office visit. The discharge information has been reviewed with the patient. The patient verbalized understanding. Discharge medications reviewed with the patient and appropriate educational materials and side effects teaching were provided.

## 2021-05-11 NOTE — PROGRESS NOTES
I have reviewed discharge instructions with the patient and spouse. Opportunities for questions offered. The patient and spouse verbalized understanding and have no further questions at this time. Copy of AVS given to  on discharge.

## 2021-05-11 NOTE — ANESTHESIA PREPROCEDURE EVALUATION
Relevant Problems   No relevant active problems       Anesthetic History   No history of anesthetic complications            Review of Systems / Medical History  Patient summary reviewed, nursing notes reviewed and pertinent labs reviewed    Pulmonary  Within defined limits                 Neuro/Psych   Within defined limits           Cardiovascular  Within defined limits                Exercise tolerance: >4 METS     GI/Hepatic/Renal  Within defined limits              Endo/Other  Within defined limits           Other Findings   Comments: Breast ca           Physical Exam    Airway  Mallampati: II  TM Distance: > 6 cm  Neck ROM: normal range of motion   Mouth opening: Normal     Cardiovascular  Regular rate and rhythm,  S1 and S2 normal,  no murmur, click, rub, or gallop             Dental  No notable dental hx       Pulmonary  Breath sounds clear to auscultation               Abdominal  GI exam deferred       Other Findings            Anesthetic Plan    ASA: 2  Anesthesia type: general          Induction: Intravenous  Anesthetic plan and risks discussed with: Patient

## 2021-05-11 NOTE — BRIEF OP NOTE
Brief Postoperative Note    Patient: Sarah De La Paz  YOB: 1965  MRN: [id number]    Date of Procedure: 5/11/2021     Pre-Op Diagnosis: LEFT BREAST CANCER    Post-Op Diagnosis: Same as preoperative diagnosis. Procedure(s):  LEFT BREAST LUMPECTOMY WITH MAGSEED AND LEFT BREAST SENTINEL NODE BIOPSY    Surgeon(s):   Bryson White MD    Surgical Assistant: Registered Nurse First Assistant: Octavio Croley RN    Anesthesia: General     Estimated Blood Loss (mL): Minimal    Complications: None    Specimens:   ID Type Source Tests Collected by Time Destination   1 : LEFT AXILLARY SENTINEL NODE #1 Fresh Axillary  Bryson White MD 5/11/2021 1436 Pathology   2 : LEFT AXILLARY SENTINEL NODE #2 Fresh Axillary  Bryson White MD 5/11/2021 1437 Pathology   3 : LEFT AXILLARY SENTINEL NODE #3 Fresh Axillary  Bryson White MD 5/11/2021 1440 Pathology   4 : LEFT AXILLARY SENTINEL NODE #4 Fresh Axillary  Bryson White MD 5/11/2021 1442 Pathology   5 : LEFT BREAST LUMPECTOMY Fresh Breast  Bryson White MD 5/11/2021 1453 Pathology   6 : LEFT BREAST NEW LATERAL MARGIN Fresh Breast  Bryson White MD 5/11/2021 1459 Pathology   7 : LEFT BREAST NEW MEDIAL MARGIN Sonya Breast  Bryson White MD 5/11/2021 1501 Pathology        Implants: * No implants in log *    Drains: * No LDAs found *    Findings: 3 sln permanent     Electronically Signed by Adri Huynh MD on 5/11/2021 at 3:21 PM  Dictated stat

## 2021-05-12 ENCOUNTER — TELEPHONE (OUTPATIENT)
Dept: SURGERY | Age: 56
End: 2021-05-12

## 2021-05-12 NOTE — TELEPHONE ENCOUNTER
Patient's  left a message that medicine was sent to wrong pharmacy. I called and there was no answer. I left a message asking which pharmacy they would like us to use. Asked them to call me back.

## 2021-05-12 NOTE — ANESTHESIA POSTPROCEDURE EVALUATION
Post-Anesthesia Evaluation and Assessment    Patient: Roopa Rivas MRN: [id number]  SSN: [id number]    YOB: 1965  Age: 61 y.o. Sex: female      I have evaluated the patient and they are stable and ready for discharge from the PACU. Cardiovascular Function/Vital Signs  Visit Vitals  /72 (BP 1 Location: Right arm, BP Patient Position: At rest)   Pulse 66   Temp 36.5 °C (97.7 °F)   Resp 17   Ht 5' 7\" (1.702 m)   Wt 77.1 kg (170 lb)   SpO2 95%   BMI 26.63 kg/m²       Patient is status post General anesthesia for Procedure(s):  LEFT BREAST LUMPECTOMY WITH MAGSEED AND LEFT BREAST SENTINEL NODE BIOPSY. Nausea/Vomiting: None    Postoperative hydration reviewed and adequate. Pain:  Pain Scale 1: Numeric (0 - 10) (05/11/21 1700)  Pain Intensity 1: 0 (05/11/21 1700)   Managed    Neurological Status:   Neuro (WDL): Within Defined Limits (05/11/21 1700)  Neuro  Neurologic State: Alert (05/11/21 1700)  LUE Motor Response: Purposeful (05/11/21 1700)  LLE Motor Response: Purposeful (05/11/21 1700)  RUE Motor Response: Purposeful (05/11/21 1700)  RLE Motor Response: Purposeful (05/11/21 1700)   At baseline    Mental Status, Level of Consciousness: Alert and  oriented to person, place, and time    Pulmonary Status:   O2 Device: None (Room air) (05/11/21 1700)   Adequate oxygenation and airway patent    Complications related to anesthesia: None    Post-anesthesia assessment completed. No concerns    Signed By: Sharyn Singh MD     May 12, 2021              Procedure(s):  LEFT BREAST LUMPECTOMY WITH MAGSEED AND LEFT BREAST SENTINEL NODE BIOPSY. general    <BSHSIANPOST>    INITIAL Post-op Vital signs:   Vitals Value Taken Time   /72 05/11/21 1700   Temp 36.5 °C (97.7 °F) 05/11/21 1535   Pulse 73 05/11/21 1707   Resp 14 05/11/21 1707   SpO2 96 % 05/11/21 1707   Vitals shown include unvalidated device data.

## 2021-05-12 NOTE — OP NOTES
[address]   OPERATIVE REPORT    Name:  Kelvin Lala  MR#:  [id number]  :  1965  ACCOUNT #:  [de-identified]  DATE OF SERVICE:  2021    PREOPERATIVE DIAGNOSIS:  Left breast cancer, lower inner quadrant. POSTOPERATIVE DIAGNOSIS:  Left breast cancer, lower inner quadrant. PROCEDURES PERFORMED:  Left Magseed-guided lumpectomy, left deep axillary sentinel lymph node biopsy, intraoperative margin assessment using RF spectroscopy. SURGEON:  Sole Galvez MD    ASSISTANT:  Mu Price. ANESTHESIA:  General.    COMPLICATIONS:  None. SPECIMENS REMOVED:  1. Left axillary sentinel node #1.  2.  Left axillary sentinel node #2. 3.  Left axillary sentinel node #3. 4.  Left axillary sentinel node #4.  5.  Lumpectomy. 6.  Lateral margin. 7.  Medial margin. IMPLANTS:  No implants. ESTIMATED BLOOD LOSS:  Minimal.    DRAINS:  None. FINDINGS:  Three sentinel lymph nodes sent for permanent. INDICATIONS FOR PROCEDURE:  This is a 67-year-old female with left breast cancer, lower inner quadrant, wished to have lumpectomy, deep axillary sentinel node biopsy. PROCEDURE:  The patient initially went to [facility], a few days before surgery and had Magseed placed in the breast, she tolerated this well. She then went, the day of surgery, to Nuclear Medicine to have technetium injected, she tolerated this well. She was seen in the preoperative holding area where surgical site was marked by surgeon and informed consent was obtained. She was taken to the operating room, laid in supine position where LMA anesthesia was induced. Left breast was prepped and draped in usual fashion and time-out was performed. Attention was turned to the left axilla where an inferior axillary hairline incision was made with a 10-blade. Bovie cautery was used to dissect through the axillary fascia.   Four sentinel nodes were identified using Neoprobe guidance, excised with LigaSure device and sent for permanent pathology. They were all normal in gross size and appearance. Next, the cavity was irrigated. A mixture of 20 mL of Exparel with 30 mL of 0.25% Marcaine, 20 mL of this was injected into the left axilla and skin. The axillary fascia was closed with interrupted 3-0 Vicryl and the skin with 4-0 subcuticular Monocryl. Attention was turned to the left breast at 8 o'clock where Magseed-guided probe was used to identify where the clip was. The radial incision was made at 8 o'clock. Bovie cautery was used to dissect down around the lesion of the chest wall. This was excised and marked short superior and long stitch lateral.  The MarginProbe device was plugged and calibrated. All six margins were assessed. For additional margins, please see above. Total intraoperative time was 2 minutes. Next, cavity was irrigated. Veraforma suture was placed on the parameter of the cavity for guidance for radiation therapy and Exparel mixture of 30 mL was injected in the breast issue and skin. The deeper tissues were closed with interrupted 2-0 Vicryl, interrupted 3-0 Vicryl and 4-0 subcuticular Monocryl on the skin. Glue was placed on the incision as well as a pressure dressing and a bra. All sponge and needle counts were correct. The patient went to Recovery in stable condition.         MD ISIS Gamez/[email or web address]_JEAN [email or web address]  D:  05/11/2021 15:26  T:  05/11/2021 21:09  JOB #:  [id number]

## 2021-05-17 ENCOUNTER — DOCUMENTATION ONLY (OUTPATIENT)
Dept: SURGERY | Age: 56
End: 2021-05-17

## 2021-05-17 NOTE — PROGRESS NOTES
Called patient with surg path  Grade 1   Has mutyh mutation increase for colon ca  Stage 1   Refer to med onc rad onc

## 2021-05-19 ENCOUNTER — TELEPHONE (OUTPATIENT)
Dept: ONCOLOGY | Age: 56
End: 2021-05-19

## 2021-05-19 NOTE — TELEPHONE ENCOUNTER
Called Patient to scheduled for a NP appt referral from [facility] - 2-3 weeks from today 5.19.21.  N/A  - Patient identified line- Lef tVM to call our office

## 2021-05-20 DIAGNOSIS — C50.212 MALIGNANT NEOPLASM OF UPPER-INNER QUADRANT OF LEFT BREAST IN FEMALE, ESTROGEN RECEPTOR POSITIVE (HCC): Primary | ICD-10-CM

## 2021-05-20 DIAGNOSIS — Z17.0 MALIGNANT NEOPLASM OF UPPER-INNER QUADRANT OF LEFT BREAST IN FEMALE, ESTROGEN RECEPTOR POSITIVE (HCC): Primary | ICD-10-CM

## 2021-05-26 ENCOUNTER — OFFICE VISIT (OUTPATIENT)
Dept: SURGERY | Age: 56
End: 2021-05-26
Payer: COMMERCIAL

## 2021-05-26 VITALS — HEIGHT: 67 IN | WEIGHT: 170 LBS | BODY MASS INDEX: 26.68 KG/M2

## 2021-05-26 DIAGNOSIS — Z98.890 S/P LUMPECTOMY, LEFT BREAST: Primary | ICD-10-CM

## 2021-05-26 PROCEDURE — 99024 POSTOP FOLLOW-UP VISIT: CPT | Performed by: SURGERY

## 2021-05-26 NOTE — PATIENT INSTRUCTIONS
Breast Cancer: Care Instructions Your Care Instructions Breast cancer occurs when abnormal cells grow out of control in the breast. These cancer cells can spread within the breast, to nearby lymph nodes and other tissues, and to other parts of the body. Being treated for cancer can weaken your body, and you may feel very tired. Get the rest your body needs so you can feel better. Finding out that you have cancer is scary. You may feel many emotions and may need some help coping. Seek out family, friends, and counselors for support. You also can do things at home to make yourself feel better while you go through treatment. Call the [employer] (5-910.602.1786) or visit its website at [employer] for more information. Follow-up care is a key part of your treatment and safety. Be sure to make and go to all appointments, and call your doctor if you are having problems. It's also a good idea to know your test results and keep a list of the medicines you take. How can you care for yourself at home? · Take your medicines exactly as prescribed. Call your doctor if you think you are having a problem with your medicine. You may get medicine for nausea and vomiting if you have these side effects. · Follow your doctor's instructions to relieve pain. Pain from cancer and surgery can almost always be controlled. Use pain medicine when you first notice pain, before it becomes severe. · Eat healthy food. If you do not feel like eating, try to eat food that has protein and extra calories to keep up your strength and prevent weight loss. Drink liquid meal replacements for extra calories and protein. Try to eat your main meal early. · Get some physical activity every day, but do not get too tired. Keep doing the hobbies you enjoy as your energy allows. · Do not smoke. Smoking can make your cancer worse. If you need help quitting, talk to your doctor about stop-smoking programs and medicines.  These can increase your chances of quitting for good. · Take steps to control your stress and workload. Learn relaxation techniques. ? Share your feelings. Stress and tension affect our emotions. By expressing your feelings to others, you may be able to understand and cope with them. ? Consider joining a support group. Talking about a problem with your spouse, a good friend, or other people with similar problems is a good way to reduce tension and stress. ? Express yourself through art. Try writing, crafts, dance, or art to relieve stress. Some dance, writing, or art groups may be available just for people who have cancer. ? Be kind to your body and mind. Getting enough sleep, eating a healthy diet, and taking time to do things you enjoy can contribute to an overall feeling of balance in your life and can help reduce stress. ? Get help if you need it. Discuss your concerns with your doctor or counselor. · If you are vomiting or have diarrhea: ? Drink plenty of fluids to prevent dehydration. Choose water and other caffeine-free clear liquids. If you have kidney, heart, or liver disease and have to limit fluids, talk with your doctor before you increase the amount of fluids you drink. ? When you are able to eat, try clear soups, mild foods, and liquids until all symptoms are gone for 12 to 48 hours. Other good choices include dry toast, crackers, cooked cereal, and gelatin dessert, such as Jell-O. · If you have not already done so, prepare a list of advance directives. Advance directives are instructions to your doctor and family members about what kind of care you want if you become unable to speak or express yourself. When should you call for help? Call 911 anytime you think you may need emergency care. For example, call if: 
  · You passed out (lost consciousness).   
Call your doctor now or seek immediate medical care if: 
  · You have a fever.  
  · You have abnormal bleeding.  
  · You think you have an infection.  
  · You have new or worse pain.  
  · You have new symptoms, such as a cough, belly pain, vomiting, diarrhea, or a rash. Watch closely for changes in your health, and be sure to contact your doctor if: 
  · You are much more tired than usual.  
  · You have swollen glands in your armpits, groin, or neck.  
  · You do not get better as expected. Where can you learn more? Go to http://www.[employer].com/ Enter V321 in the search box to learn more about \"Breast Cancer: Care Instructions. \" Current as of: December 17, 2020               Content Version: 12.8 © 6761-9119 [employer]. Care instructions adapted under license by [employer] (which disclaims liability or warranty for this information). If you have questions about a medical condition or this instruction, always ask your healthcare professional. [address] any warranty or liability for your use of this information.

## 2021-05-26 NOTE — PROGRESS NOTES
HISTORY OF PRESENT ILLNESS  Marisa De Luna is a 61 y.o. female. HPI ESTABLISHED patient here for post op follow up 5-11-21 LEFT lumpectomy with sentinel node biopsy. Patient denies pain. Pathology-    Grade 1   Has mutyh mutation increase for colon ca  Stage 1   Refer to med onc rad onc    Breast history-   5-11-21 LEFT breast lumpectomy with sentinel node biopsy- Grade 1  3/10/21 - LEFT breast biopsy -  LEFT breast invasive mammary cancer, . , HER2 negative    Breast imaging-   MRI Results (most recent):  Results from Hospital Encounter encounter on 04/13/21    MRI BREAST BI W WO CONT    Narrative  INDICATION: Left breast invasive mammary carcinoma, ER/VA positive, HER-2/imelda  negative. COMPARISON: Multiple prior breast imaging studies dating back to 2018. TECHNIQUE:  Multisequence, multiplanar, bilateral breast MRI was performed in prone position  using a dedicated breast coil. Images were obtained without contrast and dynamic  postcontrast images were obtained in multiple phases. 7.5 mL IV Gadavist was  administered. Subtraction images were reconstructed. Postcontrast images were  reviewed with dedicated kinetic analysis software. FINDINGS:  There is moderate background parenchymal enhancement and heterogeneous  fibroglandular tissue. Numerous sub-5 mm foci of enhancement are scattered in  the bilateral breasts. Numerous tiny cysts are also scattered throughout the  breasts. Left breast:  At 8:00-9:00, at the junction of the middle and posterior thirds of the left  breast, there is little to no enhancement around the biopsy clip. A tiny focus  of enhancement at its posterior inferior margin shows persistent kinetics. No  axillary or internal mammary chain lymphadenopathy. Right breast:  No suspicious enhancing foci. No axillary or internal mammary chain  lymphadenopathy.     Chest and upper abdomen: Mediastinal fat lateral to the left ventricle and  cardiac apex has heterogeneous T2 hyperintense signal (4-49) and heterogeneous  T1 signal (3-158). Multiple left cardiophrenic angle lymph nodes are at the  upper limits of normal in size. For comparison, the right cardiophrenic angle  fat pad is homogenously T1 hyperintense and saturates out completely on T2  fat-saturated images. 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 The left  brachiocephalic vein and left superior vena cava probably drains into the  coronary sinus, in the typical arrangement. There is questionable partial  anomalous pulmonary venous return to the azygous vein (9-234). Intrahepatic and extrahepatic biliary ductal dilation is mild. The common  hepatic duct measures up to 7 mm. The gallbladder is visible; the patient has  not had a cholecystectomy. In the spleen, there are at least two, oval,  circumscribed, homogenously T2 hyperintense masses (4-17, 4-19). The larger of  these 2 measures 19 x 22 mm. There is no definite enhancement on postcontrast  images, and these likely represent lymphangiomas (benign). A summary portfolio with key images has been sent from kinetic analysis software  to PACS. Impression  1. Minimal enhancement around the biopsy clip in the left breast at 8:00-9:00.  2. No lymphadenopathy. No suspicious enhancement in the right breast.  3. Infiltration of left cardiophrenic angle fat pad, of unclear etiology. 4. Mild biliary ductal dilation. 5. Duplicated SVC. 6. BI-RADS Assessment Category 6: Known biopsy proven malignancy. 7.  Recommendations:  7A. Surgical management of left breast carcinoma.  7B. Chest abdomen pelvis CT with IV contrast, to further evaluate 3. and 4.    23X            Review of Systems   All other systems reviewed and are negative. Physical Exam  Vitals and nursing note reviewed. Chest:      Comments: Incisions healing well         ASSESSMENT and PLAN    ICD-10-CM ICD-9-CM    1.  S/P lumpectomy, left breast  Z98.890 V45.89      - healing well  Refer to rad onc med onc  F/u 4-6 months

## 2021-06-01 NOTE — PROGRESS NOTES
Manjinder Zarco is a 61 y.o. female here for new patient appt for left breast cancer. Referred by Dr Kurt Chatman. She had left breast lumpectomy on 5/11/21. 1. Have you been to the ER, urgent care clinic since your last visit? Hospitalized since your last visit? New Pt    2. Have you seen or consulted any other health care providers outside of the [address] [place] [address] since your last visit? Include any pap smears or colon screening. New Pt    Pt is here alone today. She will be doing mapping with radiation next Wednesday.

## 2021-06-03 ENCOUNTER — DOCUMENTATION ONLY (OUTPATIENT)
Dept: ONCOLOGY | Age: 56
End: 2021-06-03

## 2021-06-03 ENCOUNTER — OFFICE VISIT (OUTPATIENT)
Dept: ONCOLOGY | Age: 56
End: 2021-06-03
Payer: COMMERCIAL

## 2021-06-03 VITALS
DIASTOLIC BLOOD PRESSURE: 74 MMHG | OXYGEN SATURATION: 97 % | HEIGHT: 67 IN | SYSTOLIC BLOOD PRESSURE: 123 MMHG | BODY MASS INDEX: 27.34 KG/M2 | WEIGHT: 174.2 LBS | HEART RATE: 72 BPM | TEMPERATURE: 98 F

## 2021-06-03 DIAGNOSIS — Z17.0 MALIGNANT NEOPLASM OF LEFT BREAST IN FEMALE, ESTROGEN RECEPTOR POSITIVE, UNSPECIFIED SITE OF BREAST (HCC): Primary | ICD-10-CM

## 2021-06-03 DIAGNOSIS — C50.912 MALIGNANT NEOPLASM OF LEFT BREAST IN FEMALE, ESTROGEN RECEPTOR POSITIVE, UNSPECIFIED SITE OF BREAST (HCC): Primary | ICD-10-CM

## 2021-06-03 PROCEDURE — 99205 OFFICE O/P NEW HI 60 MIN: CPT | Performed by: INTERNAL MEDICINE

## 2021-06-03 NOTE — PROGRESS NOTES
Oncology Social Work  Psychosocial Assessment    Reason for Assessment:      [] Social Work Referral [x] Initial Assessment [x] New Diagnosis [] Other    Advance Care Planning:  Advance Care Planning 4/30/2021   Confirm Advance Directive None   Patient Would Like to Complete Advance Directive Yes   Does the patient have other document types Organ Directive       Sources of Information:    [x]Patient  []Family  [x]Staff  [x]Medical Record    Mental Status:    [x]Alert  []Lethargic  []Unresponsive   [] Unable to assess   Oriented to:  [x]Person  [x]Place  [x]Time  [x]Situation      Relationship Status:  []Single  [x]  []Significant Other/Life Partner  []  []  []    Living Circumstances:  []Lives Alone  [x]Family/Significant Other in Household  []Roommates  []Children in the Home  []Paid Caregivers  []Assisted Living Facility/Group [address]  []Homeless  []Incarcerated  []Environmental/Care Concerns  []Other:    Employment Status:  []Employed Full-time [x]Employed Part-time []Homemaker  [] Retired [] Short-Term Disability [] [facility]  [] Unemployed     Barriers to Learning:    []Language  []Developmental  []Cognitive  []Altered Mental Status  []Visual/Hearing Impairment  []Unable to Read/Write  []Motivational  [] Challenges Understanding Medical Jargon [x]No Barriers Identified      Financial/Legal Concerns:    []Uninsured  []Limited Income/Resources  []Non-Citizen  []Food Insecurity [x]No Concerns Identified   []Other:    Denominational/Spiritual/Existential:  Does patient have any spiritual or Sikhism beliefs? [x] Yes [] No  Is the patient involved in a spiritual, milo or Sikhism community?  [x] Yes [] No  Patient expressing spiritual/existential angst? [] Yes [x] No  Notes:    Support System:    Identified Support Person/Group:  [x]Strong  []Fair  []Limited    Coping with Illness:   [x]  Coping Well  [] Challenges Coping with Serious Illness [] Situational Depression [] Situational Anxiety [] Anticipatory Grief  [] Recent Loss [] Caregiver [place]            Narrative:    Met with patient to introduce social work navigator role and supports. Pt  to [facility]. Pt has 2 dtrs 32 and 30. Pt very upbeat and positive and they live at [address]. Plan:   1. Introduce self and role of the  in the [address] 2. Informed the patient of the [facility] and available resources there. 3. Continue to meet with the patient when she returns to the clinic for ongoing assessment of the patient's adjustment to her diagnosis and treatment. 4. Ongoing psychosocial support as desired by patient. Referral:     Complementary therapies referral  Insurance/Entitlements referral  Financial/Medication assistance referral       Missy Wood.  SOFI Rome/DEAN  Supervisee in Social Work

## 2021-06-03 NOTE — LETTER
6/6/2021    Patient: Abdirashid Hare   YOB: 1965   Date of Visit: 6/3/2021     Nico Kauffman MD  [facility] [address]  Via In Basket    Dear Nico Kauffman MD,      Thank you for referring Ms. Abdirashid Hare to [address] [place] [address] for evaluation. My notes for this consultation are attached. If you have questions, please do not hesitate to call me. I look forward to following your patient along with you.       Sincerely,    Kimberley Chung MD

## 2021-06-04 RX ORDER — LETROZOLE 2.5 MG/1
2.5 TABLET, FILM COATED ORAL DAILY
Qty: 30 TABLET | Refills: 4 | Status: SHIPPED | OUTPATIENT
Start: 2021-06-04 | End: 2021-09-02

## 2021-06-04 NOTE — PROGRESS NOTES
PER KALIA from Dr. Den Dubon LETROZOLE 2.5MG [facility]) ONE TAB ONCE A DAY QUANTITY 30 REFILL 4

## 2021-06-06 NOTE — PROGRESS NOTES
[address], [address] [facility] [place], [address] [place] [address]  [phone]          Oncology Consultation Note        Patient: Manny Godinez MRN: [id number]  SSN: [id number]    YOB: 1965  Age: 61 y.o. Sex: female      Subjective:      Manny Godinez is a 61 y.o. 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 Garfield Franco. She underwent a screening mammogram and was noted to have an abnormality. A biopsy of the lesion established a diagnosis of invasive mucinous carcinoma. She was seen by Dr. Garfield Franco and then underwent a left breast lumpectomy on 05/11/2021. She comes in to discuss the next steps.        Review of Systems:    Constitutional: negative  Eyes: negative  Ears, Nose, Mouth, Throat, and Face: negative  Respiratory: negative  Cardiovascular: negative  Gastrointestinal: negative  Genitourinary:negative  Integument/Breast: negative  Hematologic/Lymphatic: negative  Musculoskeletal:negative  Neurological: negative        Past Medical History:   Diagnosis Date    Cancer ([facility] [address] 04/2021    LT BREAST CANCER    Nausea & vomiting     motion sickness     Past Surgical History:   Procedure Laterality Date    HX BREAST BIOPSY Left 03/2021    HX BREAST LUMPECTOMY Left 5/11/2021    LEFT BREAST LUMPECTOMY WITH MAGSEED AND LEFT BREAST SENTINEL NODE BIOPSY performed by [facility]aly [facility] at [facility] AMBULATORY OR    HX GI      colonscopy    HX HEENT      wisdom teeth    [place] ABDOMEN SURGERY PROC UNLISTED      umbilical hernia repair      Family History   Problem Relation Age of Onset   [facility] Breast Cancer Maternal Grandmother     Thyroid Disease Mother     Hypertension Mother     COPD Father     Other Father         ALS    Inflammatory Bowel Dz Sister     Other Brother         UNKNOWN    No Known Problems Brother     No Known Problems Daughter     Anesth Problems Neg Hx      Social History     Tobacco Use    Smoking status: Former Smoker     Packs/day: 0.25     Years: 1.00     Pack years: 0.25     Quit date:      Years since quittin.4    Smokeless tobacco: Never Used   Substance Use Topics    Alcohol use: Not Currently      Prior to Admission medications    Medication Sig Start Date End Date Taking? Authorizing Provider   letrozole [facility]) 2.5 mg tablet Take 1 Tablet by mouth daily. 21  Yes Abhijeet Blair MD              Allergies   Allergen Reactions    Other Plant, Animal, Environmental Runny Nose     Cats and dogs            Objective:     Vitals:    21 1313   BP: 123/74   Pulse: 72   Temp: 98 °F (36.7 °C)   SpO2: 97%   Weight: 174 lb 3.2 oz (79 kg)   Height: 5' 7\" (1.702 m)            Physical Exam:    GENERAL: alert, cooperative, no distress, appears stated age  EYE: conjunctivae/corneas clear. PERRL, EOM's intact  LYMPHATIC: Cervical, supraclavicular, and axillary nodes normal.   THROAT & NECK: normal and no erythema or exudates noted. LUNG: clear to auscultation bilaterally  HEART: regular rate and rhythm, S1, S2 normal, no murmur, click, rub or gallop  ABDOMEN: soft, non-tender. Bowel sounds normal. No masses,  no organomegaly  EXTREMITIES:  extremities normal, atraumatic, no cyanosis or edema  SKIN: Normal.  NEUROLOGIC: AOx3. Gait normal. Reflexes and motor strength normal and symmetric. Cranial nerves 2-12 and sensation grossly intact. Assessment:     1. Left breast carcinoma:  T1b N0 M0 (Stage I) infiltrating ductal carcinoma, Tumor size 6 mm, LN -ve, grade 1, %, %, Her 2 -ve. .      ECOG PS 0  Intent of Treatment - curative  Prognosis - excellent    S/P left breast lumpectomy 2021    Patient sent for consideration of adjuvant therapy. I spent significant time in explaining the rationale of adjuvant therapy is to reduce the risk of recurrence and improve the chances of cure. The risk of recurrence in the next 5 years is around 10-15%. Since the tumor is small, LN -ve and highly ER+ve, she would not benefit from adjuvant chemotherapy and thus I did not offer her this therapy. She will however benefit from adjuvant hormonal therapy. Adjuvant Aromatase inhibitor would reduce the risk of recurrence by 50% on an average. I counseled the patient regarding the hormonal therapy. Discussions included side-effect and benefit of hormonal therapy. She understood the expected side-effect which includes hot flashes, myalgias/arthralgias, osteopenia/osteoporosis with aromatase inhibitor. She agrees to take Letrozole. She understands the alternative to this is observation alone. I spent 90 minutes with the patient in a face-to-face encounter. I explained her the stage of the disease, pathophysiology of the disease and the treatment approaches. I answered all her questions. More than 50% of the time was utilized in education, counseling and co-ordination of care. The patient's emotional well being was addressed during this office visit and patient seems to be coping well with the diagnosis and the treatment. Plan:       1. Complete adjuvant radiation to the right breast  2. Start Letrozole after completion of adjuvant radiation. 3. Return in 3 months      Signed By: Mateo Wang MD     June 6, 2021         CC. Roya Cohen MD  CC.  Priscilla Felder MD

## 2021-07-26 ENCOUNTER — TELEPHONE (OUTPATIENT)
Dept: ONCOLOGY | Age: 56
End: 2021-07-26

## 2021-07-26 NOTE — TELEPHONE ENCOUNTER
Pt has been on this x 4 weeks. Swelling of hands, feeling emotional, and hot flashes have occurred. She is feeling tired and dizzy. She stopped taking this a week ago and feels better, wondering if we can switch her to something else and what are the risks/statistics if she doesn't take anything else.

## 2021-09-02 ENCOUNTER — OFFICE VISIT (OUTPATIENT)
Dept: URGENT CARE | Age: 56
End: 2021-09-02
Payer: COMMERCIAL

## 2021-09-02 VITALS — OXYGEN SATURATION: 96 % | TEMPERATURE: 97.5 F | HEART RATE: 80 BPM | RESPIRATION RATE: 16 BRPM

## 2021-09-02 DIAGNOSIS — Z20.822 EXPOSURE TO COVID-19 VIRUS: ICD-10-CM

## 2021-09-02 DIAGNOSIS — J02.9 SORE THROAT: Primary | ICD-10-CM

## 2021-09-02 DIAGNOSIS — R09.89 RUNNY NOSE: ICD-10-CM

## 2021-09-02 LAB — SARS-COV-2 POC: NEGATIVE

## 2021-09-02 PROCEDURE — S9083 URGENT CARE CENTER GLOBAL: HCPCS | Performed by: FAMILY MEDICINE

## 2021-09-02 PROCEDURE — 87426 SARSCOV CORONAVIRUS AG IA: CPT | Performed by: FAMILY MEDICINE

## 2021-09-02 NOTE — PROGRESS NOTES
This patient was seen at [address] [place] [address] Urgent Care while in their vehicle due to COVID-19 pandemic with PPE and focused examination in order to decrease community viral transmission. The patient/guardian gave verbal consent to treat. Ayse Leon is a 61 y.o. female who presents with scratchy throat, runny nose, runny nose, slight nausea since last night. Was exposed to COVID-19 by family members. Denies  fever, SOB, V/D. The history is provided by the patient.         Past Medical History:   Diagnosis Date    Cancer ([address] 2021    LT BREAST CANCER    Nausea & vomiting     motion sickness        Past Surgical History:   Procedure Laterality Date    HX BREAST BIOPSY Left 2021    HX BREAST LUMPECTOMY Left 2021    LEFT BREAST LUMPECTOMY WITH MAGSEED AND LEFT BREAST SENTINEL NODE BIOPSY performed by Johnathan Del Rosario [facility] at [facility] AMBULATORY OR    HX GI      colonscopy    HX HEENT      wisdom teeth    [place] ABDOMEN SURGERY PROC UNLISTED      umbilical hernia repair         Family History   Problem Relation Age of Onset    Breast Cancer Maternal Grandmother     Thyroid Disease Mother     Hypertension Mother     COPD Father     Other Father         ALS    Inflammatory Bowel Dz Sister     Other Brother         UNKNOWN    No Known Problems Brother     No Known Problems Daughter     Anesth Problems Neg Hx         Social History     Socioeconomic History    Marital status:      Spouse name: Not on file    Number of children: Not on file    Years of education: Not on file    Highest education level: Not on file   Occupational History    Not on file   Tobacco Use    Smoking status: Former Smoker     Packs/day: 0.25     Years: 1.00     Pack years: 0.25     Quit date:      Years since quittin.6    Smokeless tobacco: Never Used   Vaping Use    Vaping Use: Never used   Substance and Sexual Activity    Alcohol use: Not Currently    Drug use: Not Currently    Sexual activity: Not on file   Other Topics Concern    Not on file   Social History Narrative    Not on file     Social Determinants of Health     Financial Resource Strain:     Difficulty of Paying Living Expenses:    Food Insecurity:     Worried About Running Out of Food in the Last Year:     [address] in the Last Year:    Transportation Needs:     Lack of Transportation (Medical):  Lack of Transportation (Non-Medical):    Physical Activity:     Days of Exercise per Week:     Minutes of Exercise per Session:    Stress:     Feeling of Stress :    Social Connections:     Frequency of Communication with Friends and Family:     Frequency of Social Gatherings with Friends and Family:     Attends Nondenominational Services:     Active Member of Clubs or Organizations:     Attends Club or Organization Meetings:     Marital Status:    Intimate Partner Violence:     Fear of Current or Ex-Partner:     Emotionally Abused:     Physically Abused:     Sexually Abused: ALLERGIES: Other plant, animal, environmental    Review of Systems   Constitutional: Negative for activity change, appetite change and fever. HENT: Positive for rhinorrhea and sore throat. Respiratory: Positive for cough. Negative for shortness of breath. Gastrointestinal: Positive for nausea. Negative for diarrhea and vomiting. Vitals:    09/02/21 0828   Pulse: 80   Resp: 16   Temp: 97.5 °F (36.4 °C)   SpO2: 96%       Physical Exam  Vitals and nursing note reviewed. Constitutional:       General: She is not in acute distress. Appearance: She is well-developed. She is not diaphoretic. HENT:      Mouth/Throat:      Mouth: Mucous membranes are moist.      Pharynx: Oropharynx is clear. Posterior oropharyngeal erythema present. No oropharyngeal exudate. Pulmonary:      Effort: Pulmonary effort is normal. No respiratory distress. Breath sounds: Normal breath sounds. No stridor. No wheezing, rhonchi or rales. Neurological:      Mental Status: She is alert. Psychiatric:         Behavior: Behavior normal.         Thought Content: Thought content normal.         Judgment: Judgment normal.         MDM    ICD-10-CM ICD-9-CM   1. Sore throat  J02.9 462   2. Runny nose  R09.89 784.99   3. Exposure to COVID-19 virus  Z20.822 V01.79       Orders Placed This Encounter    NOVEL CORONAVIRUS (COVID-19)     Scheduling Instructions:      1) Due to current limited availability of the COVID-19 PCR test, tests will be prioritized and may not be completed.              2) Order only if the test result will change clinical management or necessary for a return to mission-critical employment decision.              3) Print and instruct patient to adhere to CDC home isolation program. (Link Above)              4) Set up or refer patient for a monitoring program.              5) Have patient sign up for and leverage MyChart (if not previously done). Order Specific Question:   Is this test for diagnosis or screening? Answer:   Diagnosis of ill patient     Order Specific Question:   Symptomatic for COVID-19 as defined by CDC? Answer:   Yes     Order Specific Question:   Date of Symptom Onset     Answer:   9/1/2021     Order Specific Question:   Hospitalized for COVID-19? Answer:   No     Order Specific Question:   Admitted to ICU for COVID-19? Answer:   No     Order Specific Question:   Employed in healthcare setting? Answer:   Unknown     Order Specific Question:   Resident in a congregate (group) care setting? Answer:   No     Order Specific Question:   Pregnant? Answer:   No     Order Specific Question:   Previously tested for COVID-19? Answer: Yes    AMB POC SARS-COV-2     Order Specific Question:   Is this test for diagnosis or screening? Answer:   Diagnosis of ill patient     Order Specific Question:   Symptomatic for COVID-19 as defined by CDC?      Answer:   Yes     Order Specific Question: Date of Symptom Onset     Answer:   9/1/2021     Order Specific Question:   Hospitalized for COVID-19? Answer:   No     Order Specific Question:   Admitted to ICU for COVID-19? Answer:   No     Order Specific Question:   Employed in healthcare setting? Answer:   Unknown     Order Specific Question:   Resident in a congregate (group) care setting? Answer:   No     Order Specific Question:   Pregnant? Answer:   No     Order Specific Question:   Previously tested for COVID-19? Answer:   Yes        Quarantine, await PCR results  Deep breathing exercises, ambulation  Tylenol prn  Increase fluids with electrolytes   MyChart: active  Provider will call if PCR COVID-19 test is positive     If signs and symptoms become worse the pt is to go to the ER.      Results for orders placed or performed in visit on 09/02/21   AMB POC SARS-COV-2   Result Value Ref Range    SARS-COV-2 POC Negative Negative       Procedures

## 2021-09-04 ENCOUNTER — OFFICE VISIT (OUTPATIENT)
Dept: URGENT CARE | Age: 56
End: 2021-09-04
Payer: COMMERCIAL

## 2021-09-04 VITALS — TEMPERATURE: 97.8 F | RESPIRATION RATE: 20 BRPM | HEART RATE: 78 BPM | OXYGEN SATURATION: 98 %

## 2021-09-04 DIAGNOSIS — J34.89 SINUS PRESSURE: ICD-10-CM

## 2021-09-04 DIAGNOSIS — R51.9 ACUTE NONINTRACTABLE HEADACHE, UNSPECIFIED HEADACHE TYPE: ICD-10-CM

## 2021-09-04 DIAGNOSIS — M79.10 MYALGIA: ICD-10-CM

## 2021-09-04 DIAGNOSIS — R68.83 CHILLS: Primary | ICD-10-CM

## 2021-09-04 DIAGNOSIS — Z20.822 EXPOSURE TO CONFIRMED CASE OF COVID-19: ICD-10-CM

## 2021-09-04 LAB
SARS-COV-2 POC: NEGATIVE
SARS-COV-2, NAA: NOT DETECTED

## 2021-09-04 PROCEDURE — 87426 SARSCOV CORONAVIRUS AG IA: CPT | Performed by: INTERNAL MEDICINE

## 2021-09-04 PROCEDURE — S9083 URGENT CARE CENTER GLOBAL: HCPCS | Performed by: INTERNAL MEDICINE

## 2021-09-04 NOTE — PATIENT INSTRUCTIONS
May use  Benadryl, Phenylephrine, or Chlor-Trimetron for continued cough and sore throat. Claritin, allegra, Xyzal, or Zyrtec may also help. Also try Breathe-Rite (or similar) nose strips for nasal congestion and difficulty sleeping. Also try SEA JAIME (gel). Try 2 teaspoons of 100% pure honey at bedtime to help with nighttime coughing/sore throat. Vitamin C (2518-7432 mg) may also be helpful for your symptoms. *Frequent handwashing*, rest, and plenty of fluids are most important. 9 Things To Do If You've Been Exposed to COVID-19    Stay home. If you've been exposed, you should stay in quarantine for at least 14 days. Ask your doctor when it's safe to end your quarantine. Don't go to school, work, or public areas. And don't use public transportation, ride-shares, or taxis unless you have no choice. Leave your home only if you need to get medical care. But call the doctor's office first so they know you're coming, and wear a cloth face cover when you go. Call your doctor. Call your doctor or other health professional to let them know that you've been exposed. They might want you to be tested, or they may have other instructions for you. If you become sick, wear a face cover when you are around other people. It can help stop the spread of the virus when you cough or sneeze. Limit contact with people in your home. If possible, stay in a separate bedroom and use a separate bathroom. Avoid contact with pets and other animals. Cover your mouth and nose with a tissue when you cough or sneeze. Then throw it in the trash right away. Wash your hands often, especially after you cough or sneeze. Use soap and water, and scrub for at least 20 seconds. If soap and water aren't available, use an alcohol-based hand . Don't share personal household items. These include bedding, towels, cups and glasses, and eating utensils. Clean and disinfect your home every day.   Use household  or disinfectant wipes or sprays. Take special care to clean things that you grab with your hands. These include doorknobs, remote controls, phones, and handles on your refrigerator and microwave. And don't forget countertops, tabletops, bathrooms, and computer keyboards. Current as of: December 18, 2020               Content Version: 12.8  © 0666-3291 Healthwise, [facility]. Care instructions adapted under license by [employer] (which disclaims liability or warranty for this information). If you have questions about a medical condition or this instruction, always ask your healthcare professional. [place] [address] any warranty or liability for your use of this information.

## 2021-09-04 NOTE — PROGRESS NOTES
HISTORY OF PRESENT ILLNESS  Rickey Figueroa is a 61 y.o. female. Pt presents today concerned for COVID infection due to exposure to daughter on . Having sinus pressure, chills, HA, and myalgias. Denies fever, cough, CP, SOB,runny nose/congestion, loss of sense of taste and smell, sore throat, diarrhea, & HA. There is no problem list on file for this patient. There are no problems to display for this patient. Allergies   Allergen Reactions    Other Plant, Animal, Environmental Runny Nose     Cats and dogs      Past Medical History:   Diagnosis Date    Cancer ([address] 2021    LT BREAST CANCER    Nausea & vomiting     motion sickness     Past Surgical History:   Procedure Laterality Date    HX BREAST BIOPSY Left 2021    HX BREAST LUMPECTOMY Left 2021    LEFT BREAST LUMPECTOMY WITH MAGSEED AND LEFT BREAST SENTINEL NODE BIOPSY performed by Olivia [facility] at [facility] AMBULATORY OR    HX GI      colonscopy    HX HEENT      wisdom teeth    [place] ABDOMEN SURGERY PROC UNLISTED      umbilical hernia repair     Family History   Problem Relation Age of Onset    Breast Cancer Maternal Grandmother     Thyroid Disease Mother    [facility] Hypertension Mother     COPD Father     Other Father         ALS    Inflammatory Bowel Dz Sister     Other Brother         UNKNOWN    No Known Problems Brother     No Known Problems Daughter     Anesth Problems Neg Hx      Social History     Tobacco Use    Smoking status: Former Smoker     Packs/day: 0.25     Years: 1.00     Pack years: 0.25     Quit date:      Years since quittin.7    Smokeless tobacco: Never Used   Substance Use Topics    Alcohol use: Not Currently        Review of Systems   Constitutional: Positive for malaise/fatigue. Negative for fever. HENT: Positive for congestion and sinus pain. Respiratory: Negative for cough. Cardiovascular: Negative for chest pain. Gastrointestinal: Negative for nausea.    Musculoskeletal: Positive for myalgias. Neurological: Positive for headaches. All other systems reviewed and are negative. Physical Exam  Vitals and nursing note reviewed. Constitutional:       General: She is not in acute distress. Appearance: She is well-developed. She is not diaphoretic. HENT:      Head: Normocephalic and atraumatic. Right Ear: External ear normal. Tympanic membrane is injected and scarred. Left Ear: External ear normal. Tympanic membrane is injected and scarred. Nose:      Right Turbinates: Not enlarged. Left Turbinates: Swollen. Right Sinus: No maxillary sinus tenderness or frontal sinus tenderness. Left Sinus: No maxillary sinus tenderness or frontal sinus tenderness. Mouth/Throat:      Pharynx: Posterior oropharyngeal erythema present. No oropharyngeal exudate. Tonsils: No tonsillar exudate. Cardiovascular:      Rate and Rhythm: Normal rate. Pulmonary:      Effort: Pulmonary effort is normal. No respiratory distress. Abdominal:      General: There is no distension. Neurological:      Mental Status: She is alert and oriented to person, place, and time. Psychiatric:         Behavior: Behavior normal.         Judgment: Judgment normal.         ASSESSMENT and PLAN  CLINICAL IMPRESSION:     ICD-10-CM ICD-9-CM    1. Chills  R68.83 780.64 AMB POC SARS-COV-2   2. Myalgia  M79.10 729.1 AMB POC SARS-COV-2   3. Acute nonintractable headache, unspecified headache type  R51.9 784.0 AMB POC SARS-COV-2   4. Sinus pressure  J34.89 478.19 AMB POC SARS-COV-2   5. Exposure to confirmed case of COVID-19  Z20.822 V01.79 AMB POC SARS-COV-2         Plan:  F/U with PCP, INI or symptoms worsen. May report to ER for SOB or CP. Advised to self-isolate for 10-14 days following potential exposure and at least 3 days following fever. Symptomatic treatment advised otherwise with use of OTC/Rx meds.   Orders Placed This Encounter    AMB POC SARS-COV-2     Order Specific Question: Is this test for diagnosis or screening? Answer:   Diagnosis of ill patient     Order Specific Question:   Symptomatic for COVID-19 as defined by CDC? Answer:   Yes     Order Specific Question:   Date of Symptom Onset     Answer:   9/2/2021     Order Specific Question:   Hospitalized for COVID-19? Answer:   No     Order Specific Question:   Admitted to ICU for COVID-19? Answer:   No     Order Specific Question:   Employed in healthcare setting? Answer:   No     Order Specific Question:   Resident in a congregate (group) care setting? Answer:   No     Order Specific Question:   Pregnant? Answer:   No     Order Specific Question:   Previously tested for COVID-19? Answer:   Yes           The patients condition was discussed with the patient and they understand. The patient is to follow up with PCP. If signs and symptoms become worse the pt is to go to the ER. The patient is to take medications as prescribed.

## 2021-11-22 ENCOUNTER — OFFICE VISIT (OUTPATIENT)
Dept: SURGERY | Age: 56
End: 2021-11-22
Payer: COMMERCIAL

## 2021-11-22 DIAGNOSIS — Z92.3 S/P RADIATION THERAPY: ICD-10-CM

## 2021-11-22 DIAGNOSIS — Z98.890 S/P LUMPECTOMY OF BREAST: ICD-10-CM

## 2021-11-22 DIAGNOSIS — C50.212 MALIGNANT NEOPLASM OF UPPER-INNER QUADRANT OF LEFT BREAST IN FEMALE, ESTROGEN RECEPTOR POSITIVE (HCC): Primary | ICD-10-CM

## 2021-11-22 DIAGNOSIS — Z85.3 HISTORY OF BREAST CANCER IN FEMALE: ICD-10-CM

## 2021-11-22 DIAGNOSIS — Z17.0 MALIGNANT NEOPLASM OF UPPER-INNER QUADRANT OF LEFT BREAST IN FEMALE, ESTROGEN RECEPTOR POSITIVE (HCC): Primary | ICD-10-CM

## 2021-11-22 PROCEDURE — 99213 OFFICE O/P EST LOW 20 MIN: CPT | Performed by: NURSE PRACTITIONER

## 2021-11-22 NOTE — PROGRESS NOTES
HISTORY OF PRESENT ILLNESS  Litzy Dao is a 61 y.o. female. HPI Established patient presents for follow-up for LEFT breast cancer. Denies breast mass, skin changes, nipple discharge and pain. Breast history -   Referring - Dr. Eb Florez - [facility]  3/3/21 - abnormal screening mammogram -[facility]  3/10/21 - LEFT breast biopsy -  LEFT breast invasive mammary cancer, %. %. HER2 negative  21 - LEFT breast lumpectomy and SLNB - Dr. 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 Loy Burton  2021 - Started letrozole, but stopped due to side effects - Dr. Renzo Teixeira      Family History -   Maternal grandmother, breast cancer,  in her 52's  A lot of cancer on mother's side   - patient had genetic testing - MUTYH mutation - increased risk for colon cancer       OB History        2    Para        Term                AB        Living           SAB        IAB        Ectopic        Molar        Multiple        Live Births              Obstetric Comments   Menarche 14, LMP 45, # of children 2, age of 3st delivery 21, Hysterectomy/oophorectomy no/no, Breast bx yes, history of breast feeding yes, BCP yes, Hormone therapy yes               Past Surgical History:   Procedure Laterality Date    HX BREAST BIOPSY Left 2021    HX BREAST LUMPECTOMY Left 2021    LEFT BREAST LUMPECTOMY WITH MAGSEED AND LEFT BREAST SENTINEL NODE BIOPSY performed by Kurt Fernandez MD at [address] HX GI      colonscopy    HX HEENT      wisdom teeth    HI ABDOMEN SURGERY PROC UNLISTED      umbilical hernia repair         ROS    Physical Exam  Constitutional:       Appearance: Normal appearance. Chest:   Breasts:      Right: No mass, nipple discharge, skin change, tenderness, axillary adenopathy or supraclavicular adenopathy.       Left: No mass, nipple discharge, skin change (well healed surgical incision to breast and axilla; minimal post XRT changes), tenderness, axillary adenopathy or supraclavicular adenopathy. Musculoskeletal:      Comments: FROM - UE x 2   Lymphadenopathy:      Upper Body:      Right upper body: No supraclavicular or axillary adenopathy. Left upper body: No supraclavicular or axillary adenopathy. Neurological:      Mental Status: She is alert. Psychiatric:         Attention and Perception: Attention normal.         Mood and Affect: Mood normal.         Speech: Speech normal.         Behavior: Behavior normal.         ASSESSMENT and PLAN  Encounter Diagnoses   Name Primary?  Malignant neoplasm of upper-inner quadrant of left breast in female, estrogen receptor positive ([facility] [address] Yes    S/P lumpectomy of breast     S/P radiation therapy     History of breast cancer in female         Normal exam with no evidence of local recurrence. Reviewed normal post treatment changes. Tried taking letrozole, but could not tolerate the side effects so she has stopped. LDmammogram 3D now - 6 months post XRT. Will have at [phone] Anticipate BDmammogram 3D in Spring 2022. Will have at [address] per their recommendation. RTC in 6 months or sooner PRN. She is comfortable with this plan. All questions answered and she stated understanding. Total time spent for this patient - 20 minutes.

## 2022-01-19 ENCOUNTER — OFFICE VISIT (OUTPATIENT)
Dept: URGENT CARE | Age: 57
End: 2022-01-19
Payer: COMMERCIAL

## 2022-01-19 VITALS — RESPIRATION RATE: 18 BRPM | HEART RATE: 74 BPM | OXYGEN SATURATION: 97 % | TEMPERATURE: 98.1 F

## 2022-01-19 DIAGNOSIS — Z20.822 SUSPECTED COVID-19 VIRUS INFECTION: Primary | ICD-10-CM

## 2022-01-19 PROCEDURE — S9083 URGENT CARE CENTER GLOBAL: HCPCS | Performed by: FAMILY MEDICINE

## 2022-01-20 NOTE — PROGRESS NOTES
This patient was seen at [address] [place] [address] Urgent Care while in their vehicle due to COVID-19 pandemic with PPE and focused examination in order to decrease community viral transmission. The patient/guardian gave verbal consent to treat. Exposed to friend with covid positive on past week-end       Cold Symptoms  The history is provided by the patient. This is a new problem. The problem occurs constantly. The problem has not changed since onset. The cough is non-productive. Associated symptoms include chills, ear congestion, ear pain, headaches, rhinorrhea, sore throat and myalgias. She has tried nothing for the symptoms. Risk factors: as above  She is not a smoker. Her past medical history is significant for bronchitis and asthma.         Past Medical History:   Diagnosis Date    Cancer ([facility] [address] 04/2021    LT BREAST CANCER    Nausea & vomiting     motion sickness        Past Surgical History:   Procedure Laterality Date    HX BREAST BIOPSY Left 03/2021    HX BREAST LUMPECTOMY Left 5/11/2021    LEFT BREAST LUMPECTOMY WITH MAGSEED AND LEFT BREAST SENTINEL NODE BIOPSY performed by Joan Arce [facility] at [facility] AM[facility]    HX GI      colonscopy    HX HEENT      wisdom teeth    GA ABDOMEN SURGERY PROC UNLISTED      umbilical hernia repair         Family History   Problem Relation Age of Onset    Breast Cancer Maternal Grandmother     Thyroid Disease Mother     Hypertension Mother     COPD Father     Other Father         ALS    Inflammatory Bowel Dz Sister     Other Brother         UNKNOWN    No Known Problems Brother     No Known Problems Daughter     Anesth Problems Neg Hx         Social History     Socioeconomic History    Marital status:      Spouse name: Not on file    Number of children: Not on file    Years of education: Not on file    Highest education level: Not on file   Occupational History    Not on file   Tobacco Use    Smoking status: Former Smoker     Packs/day: 0.25 Years: 1.00     Pack years: 0.25     Quit date: 1     Years since quittin.0    Smokeless tobacco: Never Used   Vaping Use    Vaping Use: Never used   Substance and Sexual Activity    Alcohol use: Not Currently    Drug use: Not Currently    Sexual activity: Not on file   Other Topics Concern    Not on file   Social History Narrative    Not on file     Social Determinants of Health     Financial Resource Strain:     Difficulty of Paying Living Expenses: Not on file   Food Insecurity:     Worried About Running Out of Food in the Last Year: Not on file    Sophia of Food in the Last Year: Not on file   Transportation Needs:     Lack of Transportation (Medical): Not on file    Lack of Transportation (Non-Medical): Not on file   Physical Activity:     Days of Exercise per Week: Not on file    Minutes of Exercise per Session: Not on file   Stress:     Feeling of Stress : Not on file   Social Connections:     Frequency of Communication with Friends and Family: Not on file    Frequency of Social Gatherings with Friends and Family: Not on file    Attends [employer] Services: Not on file    Active Member of [address] [place] [address] or Organizations: Not on file    Attends Club or Organization Meetings: Not on file    Marital Status: Not on file   Intimate Partner Violence:     Fear of Current or Ex-Partner: Not on file    Emotionally Abused: Not on file    Physically Abused: Not on file    Sexually Abused: Not on file   Housing Stability:     Unable to Pay for Housing in the Last Year: Not on file    Number of [place] in the Last Year: Not on file    Unstable Housing in the Last Year: Not on file                ALLERGIES: Other plant, animal, environmental    Review of Systems   Constitutional: Positive for chills. HENT: Positive for congestion, ear pain, rhinorrhea and sore throat. Respiratory: Positive for cough. Musculoskeletal: Positive for myalgias. Neurological: Positive for headaches. All other systems reviewed and are negative. Vitals:    01/19/22 1719   Pulse: 74   Resp: 18   Temp: 98.1 °F (36.7 °C)   SpO2: 97%       Physical Exam  Vitals and nursing note reviewed. Constitutional:       General: She is not in acute distress. Appearance: She is not ill-appearing. HENT:      Nose: No congestion or rhinorrhea. Mouth/Throat:      Pharynx: No oropharyngeal exudate or posterior oropharyngeal erythema. Pulmonary:      Effort: Pulmonary effort is normal. No respiratory distress. Breath sounds: No wheezing or rales. MDM    Procedures      ICD-10-CM ICD-9-CM    1. Suspected COVID-19 virus infection  Z20.822 V01.79 NOVEL CORONAVIRUS (COVID-19)     No orders of the defined types were placed in this encounter. No results found for any visits on 01/19/22. The patients condition was discussed with the patient and they understand. The patient is to follow up with primary care doctor. If signs and symptoms become worse the pt is to go to the ER. The patient is to take medications as prescribed.

## 2022-01-21 LAB
SARS-COV-2, NAA 2 DAY TAT: NORMAL
SARS-COV-2, NAA: NOT DETECTED

## (undated) DEVICE — PREP SKN CHLRAPRP APL 26ML STR --

## (undated) DEVICE — SPONGE LAP 18X18IN STRL -- 5/PK

## (undated) DEVICE — SUT SLK 2-0SH 30IN BLK --

## (undated) DEVICE — HANDLE LT SNAP ON ULT DURABLE LENS FOR TRUMPF ALC DISPOSABLE

## (undated) DEVICE — NEEDLE HYPO 25GA L1.5IN BVL ORIENTED ECLIPSE

## (undated) DEVICE — SPONGE GZ W4XL4IN COT 12 PLY TYP VII WVN C FLD DSGN

## (undated) DEVICE — SUTURE VCRL SZ 3-0 L27IN ABSRB VLT L26MM SH 1/2 CIR J316H

## (undated) DEVICE — DRAPE,REIN 53X77,STERILE: Brand: MEDLINE

## (undated) DEVICE — INSULATED BLADE ELECTRODE: Brand: EDGE

## (undated) DEVICE — INTRO 6F 23CM INPT INTR KIT --

## (undated) DEVICE — TUBING, SUCTION, 1/4" X 12', STRAIGHT: Brand: MEDLINE

## (undated) DEVICE — YANKAUER,TAPERED BULBOUS TIP,W/O VENT: Brand: MEDLINE

## (undated) DEVICE — INFECTION CONTROL KIT SYS

## (undated) DEVICE — TOWEL SURG W17XL27IN STD BLU COT NONFENESTRATED PREWASHED

## (undated) DEVICE — SUTURE MCRYL SZ 4-0 L27IN ABSRB UD L19MM PS-2 1/2 CIR PRIM Y426H

## (undated) DEVICE — DRAPE,CHEST,FENES,15X10,STERIL: Brand: MEDLINE

## (undated) DEVICE — SOL IRR SOD CL 0.9% 1000ML BTL --

## (undated) DEVICE — PACK,BASIC,SIRUS,V: Brand: MEDLINE

## (undated) DEVICE — REM POLYHESIVE ADULT PATIENT RETURN ELECTRODE: Brand: VALLEYLAB

## (undated) DEVICE — PENCIL SMK EVAC L10FT DIA95MM TBNG NONSTICK W ADPT TO 22MM

## (undated) DEVICE — DERMABOND SKIN ADH 0.7ML -- DERMABOND ADVANCED 12/BX

## (undated) DEVICE — CURVED, SMALL JAW, OPEN SEALER/DIVIDER: Brand: LIGASURE

## (undated) DEVICE — STERILE POLYISOPRENE POWDER-FREE SURGICAL GLOVES WITH EMOLLIENT COATING: Brand: PROTEXIS

## (undated) DEVICE — SURGICAL PROCEDURE PACK BASIN MAJ SET CUST NO CAUT

## (undated) DEVICE — COVER US PRB W12XL244CM FLD IORT STR TIP

## (undated) DEVICE — GARMENT,MEDLINE,DVT,INT,CALF,MED, GEN2: Brand: MEDLINE

## (undated) DEVICE — SYR 10ML LUER LOK 1/5ML GRAD --

## (undated) DEVICE — 1010 S-DRAPE TOWEL DRAPE 10/BX: Brand: STERI-DRAPE™